# Patient Record
Sex: MALE | Race: WHITE
[De-identification: names, ages, dates, MRNs, and addresses within clinical notes are randomized per-mention and may not be internally consistent; named-entity substitution may affect disease eponyms.]

---

## 2024-01-23 PROBLEM — Z00.00 ENCOUNTER FOR PREVENTIVE HEALTH EXAMINATION: Status: ACTIVE | Noted: 2024-01-23

## 2024-01-26 ENCOUNTER — APPOINTMENT (OUTPATIENT)
Dept: ORTHOPEDIC SURGERY | Facility: CLINIC | Age: 64
End: 2024-01-26
Payer: COMMERCIAL

## 2024-01-26 VITALS — WEIGHT: 235 LBS | HEIGHT: 72 IN | BODY MASS INDEX: 31.83 KG/M2

## 2024-01-26 DIAGNOSIS — Z78.9 OTHER SPECIFIED HEALTH STATUS: ICD-10-CM

## 2024-01-26 DIAGNOSIS — F17.200 NICOTINE DEPENDENCE, UNSPECIFIED, UNCOMPLICATED: ICD-10-CM

## 2024-01-26 DIAGNOSIS — M65.30 TRIGGER FINGER, UNSPECIFIED FINGER: ICD-10-CM

## 2024-01-26 PROCEDURE — 73130 X-RAY EXAM OF HAND: CPT | Mod: 50

## 2024-01-26 PROCEDURE — 20550 NJX 1 TENDON SHEATH/LIGAMENT: CPT | Mod: 50

## 2024-01-26 PROCEDURE — 99204 OFFICE O/P NEW MOD 45 MIN: CPT | Mod: 25

## 2024-01-26 RX ORDER — SIMVASTATIN 80 MG/1
80 TABLET, FILM COATED ORAL
Refills: 0 | Status: ACTIVE | COMMUNITY

## 2024-01-26 NOTE — ASSESSMENT
[FreeTextEntry1] : EXAM Right hand with no swelling or erythema. Able to make fist, oppose thumb to small finger with good thenar bulk. No intrinsic atrophy. Tender along distal palmar crease, most notable at thumb. No overt locking with catching palpable. Sensation intact throughout with <2sec cap refill.  Right hand radiographs with no fracture nor dislocation. Carpus aligned. (3-view)    Left hand with no swelling or erythema. Able to make fist, oppose thumb to small finger with good thenar bulk. No intrinsic atrophy. Tender along distal palmar crease, most notable at thumb. No overt locking with catching palpable. Sensation intact throughout with <2sec cap refill.  Left hand radiographs with no fracture nor dislocation. Carpus aligned. (3-view)     ASSESSMENT & PLAN Bilateral thumb trigger - Discussed with patient the pathoanatomy of trigger and options going forward. Risks/benefits discussed as well as natural progression that injection will provide some relief but symptoms may recur. Discussed that pain may worsen in coming days but will subside. Discussed that we can repeat an injection or that operative intervention may be indicated down the line. After discussion of risks/benefits, including glucose intolerance in the diabetic population, patient elected to proceed with CSI to the A1 pulley.  Procedure 1 - 0.5cc 1% lidocaine + 0.5cc 40mg/cc Kenalog administered to the right thumb A1 pulley following sterilization with Betadine. Patient tolerated this well. Procedure 2 - 0.5cc 1% lidocaine + 0.5cc 40mg/cc Kenalog administered to the left thumb A1 pulley following sterilization with Betadine. Patient tolerated this well.   F/u 3months

## 2024-01-26 NOTE — HISTORY OF PRESENT ILLNESS
[de-identified] : Age: 63M PMHx: HLD Hand Dominance: RHD Chief Complaint: bilateral hand pain (Right hand onset approx 6-8 months ago, Left hand onset early January 2024) with NKI. Patient reports his symptoms onset with no precipitating factors. Patient reports that he has constant soreness in the base of the bilateral thumbs that radiates into the hands. Patient reports that his left thumb is also triggering, stating that it clicking and locking. Denies numbness/tingling.  Trauma: NKI Outside Imaging/Treatment: none OTC Medications:  OT/PT: none  Bracing: none  Pain worse with: exertion Pain better with: rest Numbness/tingling: none

## 2024-02-15 ENCOUNTER — APPOINTMENT (OUTPATIENT)
Dept: CARDIOLOGY | Facility: CLINIC | Age: 64
End: 2024-02-15
Payer: COMMERCIAL

## 2024-02-15 VITALS
SYSTOLIC BLOOD PRESSURE: 112 MMHG | BODY MASS INDEX: 31.97 KG/M2 | DIASTOLIC BLOOD PRESSURE: 74 MMHG | WEIGHT: 236 LBS | HEIGHT: 72 IN | OXYGEN SATURATION: 100 % | HEART RATE: 90 BPM

## 2024-02-15 VITALS — DIASTOLIC BLOOD PRESSURE: 74 MMHG | SYSTOLIC BLOOD PRESSURE: 118 MMHG

## 2024-02-15 DIAGNOSIS — R06.00 DYSPNEA, UNSPECIFIED: ICD-10-CM

## 2024-02-15 DIAGNOSIS — Z87.09 PERSONAL HISTORY OF OTHER DISEASES OF THE RESPIRATORY SYSTEM: ICD-10-CM

## 2024-02-15 DIAGNOSIS — R06.02 SHORTNESS OF BREATH: ICD-10-CM

## 2024-02-15 DIAGNOSIS — Z86.39 PERSONAL HISTORY OF OTHER ENDOCRINE, NUTRITIONAL AND METABOLIC DISEASE: ICD-10-CM

## 2024-02-15 DIAGNOSIS — E78.5 HYPERLIPIDEMIA, UNSPECIFIED: ICD-10-CM

## 2024-02-15 DIAGNOSIS — F10.21 ALCOHOL DEPENDENCE, IN REMISSION: ICD-10-CM

## 2024-02-15 DIAGNOSIS — Z87.898 PERSONAL HISTORY OF OTHER SPECIFIED CONDITIONS: ICD-10-CM

## 2024-02-15 PROCEDURE — 99244 OFF/OP CNSLTJ NEW/EST MOD 40: CPT

## 2024-02-15 RX ORDER — BUDESONIDE AND FORMOTEROL FUMARATE DIHYDRATE 160; 4.5 UG/1; UG/1
160-4.5 AEROSOL RESPIRATORY (INHALATION)
Refills: 0 | Status: ACTIVE | COMMUNITY

## 2024-02-15 RX ORDER — ALBUTEROL 90 MCG
90 AEROSOL (GRAM) INHALATION
Refills: 0 | Status: ACTIVE | COMMUNITY

## 2024-02-15 NOTE — REASON FOR VISIT
[FreeTextEntry1] : The patient is referred for cardiology evaluation because of a long history of cigarette smoking.  The patient has some shortness of breath.  The patient has been smoking cigarettes since his teenage years.  He quit briefly but has relapsed.  In addition the patient has history of crack cocaine use.  In addition the patient is a recovering alcoholic.  The patient is able to exercise 2030 minutes.  There is no exertional chest discomfort or shortness of breath.  Sometimes the patient coughs and during coughing he feels short of breath.  There has been no chest discomfort.  The patient's father coronary disease in his 50s.  The patient's father also was a cigarette smoker.

## 2024-02-15 NOTE — ASSESSMENT
[FreeTextEntry1] : Shortness of breath: Given the extensive cigarette smoking history have recommended pulmonary evaluation to consider low-dose CT of the chest for screening for cancer.  Exercise stress testing has been arranged to rule out ischemia.  Transthoracic echocardiography has been arranged to rule out pericardial disease.  Hyperlipidemia: The patient recently ran out of simvastatin and he is attempting to get but he is having insurance issues.  Once he is on a stable dose of simvastatin for 6 weeks time I have advised him to get LFTs and cholesterol profile which I have ordered for him.

## 2024-03-19 ENCOUNTER — APPOINTMENT (OUTPATIENT)
Dept: CT IMAGING | Facility: CLINIC | Age: 64
End: 2024-03-19

## 2024-04-22 ENCOUNTER — APPOINTMENT (OUTPATIENT)
Dept: CARDIOLOGY | Facility: CLINIC | Age: 64
End: 2024-04-22

## 2024-06-20 ENCOUNTER — INPATIENT (INPATIENT)
Facility: HOSPITAL | Age: 64
LOS: 17 days | Discharge: HOME | DRG: 881 | End: 2024-07-08
Attending: PSYCHIATRY & NEUROLOGY | Admitting: PSYCHIATRY & NEUROLOGY
Payer: MEDICARE

## 2024-06-20 VITALS
HEART RATE: 78 BPM | TEMPERATURE: 97 F | RESPIRATION RATE: 18 BRPM | DIASTOLIC BLOOD PRESSURE: 83 MMHG | HEIGHT: 72 IN | WEIGHT: 204.59 LBS | SYSTOLIC BLOOD PRESSURE: 120 MMHG

## 2024-06-20 DIAGNOSIS — F32.A DEPRESSION, UNSPECIFIED: ICD-10-CM

## 2024-06-20 PROCEDURE — 36415 COLL VENOUS BLD VENIPUNCTURE: CPT

## 2024-06-20 PROCEDURE — 84443 ASSAY THYROID STIM HORMONE: CPT

## 2024-06-20 PROCEDURE — 83036 HEMOGLOBIN GLYCOSYLATED A1C: CPT

## 2024-06-20 PROCEDURE — 80053 COMPREHEN METABOLIC PANEL: CPT

## 2024-06-20 PROCEDURE — 85027 COMPLETE CBC AUTOMATED: CPT

## 2024-06-20 PROCEDURE — 80061 LIPID PANEL: CPT

## 2024-06-20 RX ORDER — SERTRALINE HYDROCHLORIDE 100 MG/1
50 TABLET, FILM COATED ORAL DAILY
Refills: 0 | Status: DISCONTINUED | OUTPATIENT
Start: 2024-06-20 | End: 2024-06-21

## 2024-06-20 RX ORDER — LORAZEPAM 0.5 MG
1 TABLET ORAL EVERY 4 HOURS
Refills: 0 | Status: DISCONTINUED | OUTPATIENT
Start: 2024-06-20 | End: 2024-06-21

## 2024-06-20 RX ORDER — ACETAMINOPHEN 325 MG
650 TABLET ORAL EVERY 6 HOURS
Refills: 0 | Status: DISCONTINUED | OUTPATIENT
Start: 2024-06-20 | End: 2024-07-08

## 2024-06-20 RX ORDER — TRAZODONE HYDROCHLORIDE 50 MG/1
50 TABLET, FILM COATED ORAL AT BEDTIME
Refills: 0 | Status: DISCONTINUED | OUTPATIENT
Start: 2024-06-20 | End: 2024-07-08

## 2024-06-20 RX ADMIN — TRAZODONE HYDROCHLORIDE 50 MILLIGRAM(S): 50 TABLET, FILM COATED ORAL at 20:39

## 2024-06-20 RX ADMIN — Medication 1 MILLIGRAM(S): at 20:39

## 2024-06-20 NOTE — ED BEHAVIORAL HEALTH NOTE - BEHAVIORAL HEALTH NOTE
I have reviewed the information from the ED Psychiatric assessment, communicated with the sending psychiatric team (directly or via handoff from previous shift attending), and interviewed the patient.  I have confirmed that the patient is in need of care and treatment in an inpatient psychiatric hospital since the patient poses a substantial threat of harm to self or others as a result of their mental illness. I have afforded the patient the opportunity to ask questions regarding their inpatient psychiatry status and rights.  I have written orders to address active medical, substance, and psychiatric conditions- and discussed these orders with the inpatient RN. There is NO clinical indication at this time for constant observation for suicidality, self-harm, hypersexuality, aggression, falls, or elopement.      Clinical summary, assessment, and plan in sending ED chart with the following modifications:   Ativan 1mg PRN for anxiety exacerbation

## 2024-06-20 NOTE — BH PATIENT PROFILE - NSSBIRTALCPOSREINDET_GEN_A_CORE
pt enc to continue his sobriety and discuss feelings/ needs to better build his [id number] and coping mechanisms.

## 2024-06-20 NOTE — BH PATIENT PROFILE - FALL HARM RISK - UNIVERSAL INTERVENTIONS
Bed in lowest position, wheels locked, appropriate side rails in place/Call bell, personal items and telephone in reach/Instruct patient to call for assistance before getting out of bed or chair/Non-slip footwear when patient is out of bed/[place] to call system/Physically safe environment - no spills, clutter or unnecessary equipment/Purposeful Proactive Rounding/Room/bathroom lighting operational, light cord in reach

## 2024-06-20 NOTE — BH PATIENT PROFILE - NSICDXPASTMEDICALHX_GEN_ALL_CORE_FT
PAST MEDICAL HISTORY:  H/O: depression     History of alcohol use     History of anxiety     Hyperlipidemia

## 2024-06-20 NOTE — BH PATIENT PROFILE - NSSBIRTDRGACTION/INTER_GEN_A_CORE
Positive reinforcement/Brief intervention Positive reinforcement/Brief intervention/Passive referral to treatment

## 2024-06-20 NOTE — BH PATIENT PROFILE - HOME MEDICATIONS
No Rx/Hx Recorded Zoloft 25 mg oral tablet , 1 tab(s) orally once a day  simvastatin 80 mg oral tablet , 1 tab(s) orally once a day  Symbicort 160 mcg-4.5 mcg/inh inhalation aerosol , 2 puff(s) inhaled 2 times a day

## 2024-06-21 DIAGNOSIS — F10.21 ALCOHOL DEPENDENCE, IN REMISSION: ICD-10-CM

## 2024-06-21 DIAGNOSIS — F14.11 COCAINE ABUSE, IN REMISSION: ICD-10-CM

## 2024-06-21 PROCEDURE — 99232 SBSQ HOSP IP/OBS MODERATE 35: CPT

## 2024-06-21 RX ORDER — BUDESONIDE/FORMOTEROL FUMARATE 160-4.5MCG
2 HFA AEROSOL WITH ADAPTER (GRAM) INHALATION
Refills: 0 | Status: DISCONTINUED | OUTPATIENT
Start: 2024-06-21 | End: 2024-07-08

## 2024-06-21 RX ORDER — NICOTINE POLACRILEX 2 MG/1
1 LOZENGE ORAL DAILY
Refills: 0 | Status: DISCONTINUED | OUTPATIENT
Start: 2024-06-21 | End: 2024-07-08

## 2024-06-21 RX ORDER — HALOPERIDOL DECANOATE 100 MG/ML
5 VIAL (ML) INTRAMUSCULAR EVERY 6 HOURS
Refills: 0 | Status: DISCONTINUED | OUTPATIENT
Start: 2024-06-21 | End: 2024-07-08

## 2024-06-21 RX ORDER — NICOTINE POLACRILEX 2 MG/1
2 LOZENGE ORAL
Refills: 0 | Status: DISCONTINUED | OUTPATIENT
Start: 2024-06-21 | End: 2024-07-08

## 2024-06-21 RX ORDER — LORAZEPAM 0.5 MG
2 TABLET ORAL EVERY 6 HOURS
Refills: 0 | Status: DISCONTINUED | OUTPATIENT
Start: 2024-06-21 | End: 2024-06-21

## 2024-06-21 RX ORDER — HYDROXYZINE PAMOATE 50 MG/1
50 CAPSULE ORAL EVERY 6 HOURS
Refills: 0 | Status: DISCONTINUED | OUTPATIENT
Start: 2024-06-21 | End: 2024-07-08

## 2024-06-21 RX ORDER — ATORVASTATIN CALCIUM 20 MG/1
40 TABLET, FILM COATED ORAL AT BEDTIME
Refills: 0 | Status: DISCONTINUED | OUTPATIENT
Start: 2024-06-21 | End: 2024-07-08

## 2024-06-21 RX ADMIN — Medication 50 MILLIGRAM(S): at 19:53

## 2024-06-21 RX ADMIN — Medication 2 PUFF(S): at 19:54

## 2024-06-21 RX ADMIN — SERTRALINE HYDROCHLORIDE 50 MILLIGRAM(S): 100 TABLET, FILM COATED ORAL at 08:45

## 2024-06-21 RX ADMIN — ATORVASTATIN CALCIUM 40 MILLIGRAM(S): 20 TABLET, FILM COATED ORAL at 19:54

## 2024-06-21 RX ADMIN — TRAZODONE HYDROCHLORIDE 50 MILLIGRAM(S): 50 TABLET, FILM COATED ORAL at 19:53

## 2024-06-21 NOTE — BH INPATIENT PSYCHIATRY ASSESSMENT NOTE - NSBHMETABOLIC_PSY_ALL_CORE_FT
BMI: BMI (kg/m2): 27.7 (06-20-24 @ 19:28)  HbA1c:   Glucose:   BP: 90/64 (06-21-24 @ 08:22) (90/64 - 120/83)Vital Signs Last 24 Hrs  T(C): 37.1 (06-21-24 @ 08:22), Max: 37.1 (06-21-24 @ 08:22)  T(F): 98.7 (06-21-24 @ 08:22), Max: 98.7 (06-21-24 @ 08:22)  HR: 85 (06-21-24 @ 08:22) (78 - 85)  BP: 90/64 (06-21-24 @ 08:22) (90/64 - 120/83)  BP(mean): --  RR: 18 (06-21-24 @ 08:22) (18 - 18)  SpO2: --      Lipid Panel:  BMI: BMI (kg/m2): 27.7 (06-20-24 @ 19:28)  HbA1c:   Glucose:   BP: 110/80 (06-21-24 @ 15:39) (90/64 - 120/83)Vital Signs Last 24 Hrs  T(C): 36.4 (06-21-24 @ 15:39), Max: 37.1 (06-21-24 @ 08:22)  T(F): 97.6 (06-21-24 @ 15:39), Max: 98.7 (06-21-24 @ 08:22)  HR: 106 (06-21-24 @ 15:39) (78 - 106)  BP: 110/80 (06-21-24 @ 15:39) (90/64 - 120/83)  BP(mean): --  RR: 18 (06-21-24 @ 15:39) (18 - 18)  SpO2: --      Lipid Panel:

## 2024-06-21 NOTE — BH SOCIAL WORK INITIAL PSYCHOSOCIAL EVALUATION - FAMILY HISTORY OF PSYCHIATRIC ILLNESS / SUICIDALITY
Partially impaired: cannot see medication labels or newsprint, but can see obstacles in path, and the surrounding layout; can count fingers at arm's length None known

## 2024-06-21 NOTE — BH INPATIENT PSYCHIATRY ASSESSMENT NOTE - RISK ASSESSMENT
risk factors: male, single, hx substance use, not engaged in tx, +SI, limited social support, sleep and appetite disturbances   protective factors: help-seeking bx, cooperative with treatment, sobriety, fair insight

## 2024-06-21 NOTE — BH INPATIENT PSYCHIATRY ASSESSMENT NOTE - NSBHADMITMEDEDUDETAILS_A_CORE FT
Medications management, encourage groups/DBT, suicide precautions, enhanced supervision Educated on indication, RABs and side effects profile of seroquel

## 2024-06-21 NOTE — BH INPATIENT PSYCHIATRY ASSESSMENT NOTE - NSBHCHARTREVIEWVS_PSY_A_CORE FT
Vital Signs Last 24 Hrs  T(C): 37.1 (06-21-24 @ 08:22), Max: 37.1 (06-21-24 @ 08:22)  T(F): 98.7 (06-21-24 @ 08:22), Max: 98.7 (06-21-24 @ 08:22)  HR: 85 (06-21-24 @ 08:22) (78 - 85)  BP: 90/64 (06-21-24 @ 08:22) (90/64 - 120/83)  BP(mean): --  RR: 18 (06-21-24 @ 08:22) (18 - 18)  SpO2: --     Vital Signs Last 24 Hrs  T(C): 36.4 (06-21-24 @ 15:39), Max: 37.1 (06-21-24 @ 08:22)  T(F): 97.6 (06-21-24 @ 15:39), Max: 98.7 (06-21-24 @ 08:22)  HR: 106 (06-21-24 @ 15:39) (78 - 106)  BP: 110/80 (06-21-24 @ 15:39) (90/64 - 120/83)  BP(mean): --  RR: 18 (06-21-24 @ 15:39) (18 - 18)  SpO2: --

## 2024-06-21 NOTE — BH INPATIENT PSYCHIATRY ASSESSMENT NOTE - DETAILS
mother and maternal aunts- bipolar disorder  son- substance use d/o As per HPI wellbutrin xl- worsened anxiety/hyper, zoloft- shakes mother- bipolar disorder, reports hx lithium and ECT treatment; maternal aunts- bipolar disorder; son- substance use d/o

## 2024-06-21 NOTE — BH INPATIENT PSYCHIATRY ASSESSMENT NOTE - NSTXSUICIDINTERMD_PSY_ALL_CORE
Medications management, encourage groups/DBT, suicide precautions, enhanced supervision, safety planning

## 2024-06-21 NOTE — CHART NOTE - NSCHARTNOTEFT_GEN_A_CORE
[facility] [phone]    Simbicort 160/4.5 2 puffs BID, 5/14  Simvastatin 80 mg once/day, 5/13  Zoloft 25 mg once/day, 5/8 Collateral obtained from [facility] [phone]-  Symbicort 160/4.5 2 puffs BID, 5/14  Simvastatin 80 mg once/day, 5/13  Zoloft 25 mg once/day, 5/8

## 2024-06-21 NOTE — BH INPATIENT PSYCHIATRY ASSESSMENT NOTE - NSBHCHARTREVIEWINVESTIGATE_PSY_A_CORE FT
EKG  EKG obtained on 6/19/24-  Ventricular rate 75  BPM  Final       Atrial rate 75  BPM  Final       P-R interval 188  ms  Final       QRS duration 84  ms  Final       Q-T interval 380  ms  Final       Q-T interval corrected 424  ms  Final       P wave axis 59  degrees  Final       R wave axis 59  degrees  Final       T wave axis 71  degrees  Final       WAVEFORM URL       Final       Diagnosis Line Normal sinus rhythm Normal ECG Confirmed by Valentino, Patrick ([id number]) on 6/20/2024 12:24:43 PM

## 2024-06-21 NOTE — BH INPATIENT PSYCHIATRY ASSESSMENT NOTE - NSBHASSESSSUMMFT_PSY_ALL_CORE
64-year-old male; domiciled at skilled nursing house; non-caregiver; self-reported PPHx of ?bipolar disorder, no prior admissions, denies hx of SA; PMHx of HLD; hx of polysubstance use (tobacco, alcohol, crack), reportedly sober x7 months, denies hx of withdrawal seizures/DTs  , +legal hx for DWIs; BIB EMS; psychiatry consulted for SI.  Pt with depressive symptoms, ongoing SI, unable to fully safety plan.  He would benefit from admission for safety and stabilization at this time.  63 y/o male, domiciled at [facility] house, non-caregiver, self-reported PPHx of bipolar disorder, no prior admissions, denies hx of SA/SIB, PMHx of HLD, hx of polysubstance use (tobacco, alcohol, crack; reportedly sober x7 months), denies hx of withdrawal seizures/DTs , +legal hx for DWIs, BIB EMS activated by self for SI. Pt endorsed worsening depressive sx in last month after quitting stressful job, and worry of being kicked out of his [facility] house d/t lack of participation. He endorsed poor sleep, anhedonia, does not want to get out of bed, low energy, decreased appetite. He also reports passive SI with thoughts of using a knife to his neck or a gun if he had one. Of note, pt with significant family hx of bipolar d/o and endorses episodes of likely hypomania, although difficult to ascertain during periods of sobriety. Pt will likely benefit from IPP at this time.     #Bipolar depression  -start seroquel 50 mg qHS, titrate seroquel 100 mg qHS (6/22) **continue to titrate as indicated    #AUD in early remission  #Hx cocaine abuse  -CATCH consult    #Agitation  -for agitation not amenable to verbal redirection, may give haldol 5 mg q6h prn and ativan 2 mg q6h prn with escalation to IM if pt is refusing PO and is an acute danger to self or/and others with repeat EKG to ensure QTc <500 ms 65 y/o male, domiciled at detention house, non-caregiver, self-reported PPHx of bipolar disorder, no prior admissions, denies hx of SA/SIB, PMHx of HLD, hx of polysubstance use (tobacco, alcohol, crack; reportedly sober x7 months), denies hx of withdrawal seizures/DTs , +legal hx for DWIs, BIB EMS activated by self for SI. Pt endorsed worsening depressive sx in last month after quitting stressful job, and worry of being kicked out of his detention house d/t lack of participation. He endorsed poor sleep, anhedonia, does not want to get out of bed, low energy, decreased appetite. He also reports passive SI with thoughts of using a knife to his neck or a gun if he had one. Of note, pt with significant family hx of bipolar d/o and endorses episodes of likely hypomania, although difficult to ascertain whether episodes occurred during periods of sobriety. Pt will likely benefit from IPP at this time.     #Bipolar depression  -start seroquel 50 mg qHS, titrate seroquel 100 mg qHS (6/22) **continue to titrate as indicated    #AUD in early remission  #Hx cocaine abuse  -CATCH consult    #Agitation  -for agitation not amenable to verbal redirection, may give haldol 5 mg q6h prn and ativan 2 mg q6h prn with escalation to IM if pt is refusing PO and is an acute danger to self or/and others with repeat EKG to ensure QTc <500 ms

## 2024-06-21 NOTE — BH INPATIENT PSYCHIATRY ASSESSMENT NOTE - HPI (INCLUDE ILLNESS QUALITY, SEVERITY, DURATION, TIMING, CONTEXT, MODIFYING FACTORS, ASSOCIATED SIGNS AND SYMPTOMS)
65 y/o male, domiciled at [facility], non-caregiver, self-reported PPHx of bipolar disorder, no prior admissions, denies hx of SA/SIB, PMHx of HLD, hx of polysubstance use (tobacco, alcohol, crack; reportedly sober x7 months), denies hx of withdrawal seizures/DTs , +legal hx for DWIs, BIB EMS activated by self for SI.       Pt reported he has been shaking a lot, feels dehydrated, states he is hardly urinating, and hasn't been eating resulting in >10 lb weight loss in last month.  Pt reports depressed mood on and off his whole life, however significantly worsened in last month after quitting his job 1.5 months ago. He reports his job as a  was too stressful. He reports in the last month, he has been with poor sleep, anhedonia, does not want to get out of bed, low energy, decreased appetite. He also reports passive SI with thoughts of using a knife to his neck or a gun if he had one.  Pt unable to identify what stops him from acting on these thoughts. He admits to fear that he will be kicked out of the [facility] house though denies relapsing or doing anything specific to warrant that. 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 He reports h/o episodes lasting 2-3 days of decreased sleep, elevated energy and increased goal-directed activities; of note, pt also reports longest period of sobriety lasting 1 year since age 7. He denies AH or VH. He denies his life is in danger or others are trying to harm/follow him. No overt delusions elicited.    SAFE Act completed:   Submitted On: 6/19/2024 6:15:49 PM    Reference Number: [id number]  65 y/o male, domiciled at [place] house, non-caregiver, self-reported PPHx of bipolar disorder, no prior admissions, denies hx of SA/SIB, PMHx of HLD, hx of polysubstance use (tobacco, alcohol, crack; reportedly sober x7 months), denies hx of withdrawal seizures/DTs , +legal hx for DWIs, BIB EMS activated by self for SI to [facility], and admitted to [facility] IPP.    Pt reported he has been shaking a lot, feels dehydrated, states he is hardly urinating, and hasn't been eating resulting in >10 lb weight loss in last month.  Pt reports depressed mood on and off his whole life, however significantly worsened in last month after quitting his job 1.5 months ago. He reports his job as a  was too stressful. He reports in the last month, he has been with poor sleep, anhedonia, does not want to get out of bed, low energy, decreased appetite. He also reports passive SI with thoughts of using a knife to his neck or a gun if he had one.  Pt unable to identify what stops him from acting on these thoughts. He admits to fear that he will be kicked out of the [place] house though denies relapsing or doing anything specific to warrant that. 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 He reports h/o episodes lasting 2-3 days of decreased sleep, elevated energy and increased goal-directed activities; of note, pt also reports longest period of sobriety lasting 1 year since age 7. He denies AH or VH. He denies his life is in danger or others are trying to harm/follow him. No overt delusions elicited.    SAFE Act completed:   Submitted On: 6/19/2024 6:15:49 PM    Reference Number: [id number]

## 2024-06-21 NOTE — CHART NOTE - NSCHARTNOTEFT_GEN_A_CORE
Spoke to patient's friend, Xander [phone], to gather collateral. Friend states that at baseline, patient is a "happy-go-génesis hollis" who likes to cook for the other residents at the long term house and is typically very cheerful. 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 Reports that patient would rarely leave the house, watching TV all day. Also reports patient losing interest in his usual hobbies such as golf and barely eating. When writer asked about patient experiencing SI, friend states that patient has said things such as "I'd be better off not alive" and "I wish I had a gun" Spoke to patient's friend, Xander [phone], to gather collateral. Friend states that at baseline, patient is a "happy-go-génesis hollis" who is typically very cheerful. 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 Reports that patient rarely leaves the house, watching TV all day. Reports patient losing interest in his usual hobbies such as golf and barely eating. When writer asked about patient experiencing SI, friend states that patient has verbalized things such as "I'd be better off not alive" and "I wish I had a gun". Also provides context, stating patient lost his son to drug overdose 3-4 years ago and does not have a relationship with his daughter. Per friend, in December, patient experienced an episode where he became very talkative, had increased energy, and decreased sleep (cooking at 5am). Spoke to patient's friend, Xander [phone]- states that at baseline, patient is a "happy-go-génesis hollis" who is typically very cheerful. 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 Reports that patient rarely leaves the house, watching TV all day. Reports patient losing interest in his usual hobbies such as golf and barely eating. When writer asked about patient experiencing SI, friend states that patient has verbalized things such as "I'd be better off not alive" and "I wish I had a gun". Also provides context, stating patient lost his son to drug overdose 3-4 years ago and does not have a relationship with his daughter. Per friend, in December, patient experienced an episode where he became very talkative, had increased energy, and decreased sleep (cooking at 5am).

## 2024-06-21 NOTE — BH INPATIENT PSYCHIATRY ASSESSMENT NOTE - CURRENT MEDICATION
MEDICATIONS  (STANDING):  atorvastatin 40 milliGRAM(s) Oral at bedtime  budesonide 160 MICROgram(s)/formoterol 4.5 MICROgram(s) Inhaler 2 Puff(s) Inhalation two times a day  QUEtiapine 50 milliGRAM(s) Oral at bedtime    MEDICATIONS  (PRN):  acetaminophen     Tablet .. 650 milliGRAM(s) Oral every 6 hours PRN Temp greater or equal to 38C (100.4F), Moderate Pain (4 - 6)  LORazepam     Tablet 1 milliGRAM(s) Oral every 4 hours PRN Anxiety  traZODone 50 milliGRAM(s) Oral at bedtime PRN insomnia   MEDICATIONS  (STANDING):  atorvastatin 40 milliGRAM(s) Oral at bedtime  budesonide 160 MICROgram(s)/formoterol 4.5 MICROgram(s) Inhaler 2 Puff(s) Inhalation two times a day  QUEtiapine 50 milliGRAM(s) Oral at bedtime    MEDICATIONS  (PRN):  acetaminophen     Tablet .. 650 milliGRAM(s) Oral every 6 hours PRN Temp greater or equal to 38C (100.4F), Moderate Pain (4 - 6)  haloperidol     Tablet 5 milliGRAM(s) Oral every 6 hours PRN agitation  hydrOXYzine hydrochloride 50 milliGRAM(s) Oral every 6 hours PRN anxiety/insomnia  LORazepam     Tablet 2 milliGRAM(s) Oral every 6 hours PRN combative bx  traZODone 50 milliGRAM(s) Oral at bedtime PRN insomnia   MEDICATIONS  (STANDING):  atorvastatin 40 milliGRAM(s) Oral at bedtime  budesonide 160 MICROgram(s)/formoterol 4.5 MICROgram(s) Inhaler 2 Puff(s) Inhalation two times a day  nicotine -  14 mG/24Hr(s) Patch 1 Patch Transdermal daily  QUEtiapine 50 milliGRAM(s) Oral at bedtime    MEDICATIONS  (PRN):  acetaminophen     Tablet .. 650 milliGRAM(s) Oral every 6 hours PRN Temp greater or equal to 38C (100.4F), Moderate Pain (4 - 6)  haloperidol     Tablet 5 milliGRAM(s) Oral every 6 hours PRN agitation  hydrOXYzine hydrochloride 50 milliGRAM(s) Oral every 6 hours PRN anxiety/insomnia  LORazepam     Tablet 2 milliGRAM(s) Oral every 6 hours PRN combative bx  nicotine  Polacrilex Gum 2 milliGRAM(s) Oral every 2 hours PRN Smoking Cessation  traZODone 50 milliGRAM(s) Oral at bedtime PRN insomnia

## 2024-06-21 NOTE — BH INPATIENT PSYCHIATRY ASSESSMENT NOTE - NSBHATTESTAPPBILLTIME_PSY_A_CORE
I attest my time as HORACIO is greater than 50% of the total combined time spent on qualifying patient care activities. I have reviewed and verified the documentation.

## 2024-06-21 NOTE — BH SOCIAL WORK INITIAL PSYCHOSOCIAL EVALUATION - OTHER PAST PSYCHIATRIC HISTORY (INCLUDE DETAILS REGARDING ONSET, COURSE OF ILLNESS, INPATIENT/OUTPATIENT TREATMENT)
PPHx of bipolar disorder, no prior admissions, denies hx of SA/SIB, PMHx of HLD, hx of polysubstance use (tobacco, alcohol, crack; reportedly sober x7 months)

## 2024-06-22 LAB
A1C WITH ESTIMATED AVERAGE GLUCOSE RESULT: 6.3 % — HIGH (ref 4–5.6)
ALBUMIN SERPL ELPH-MCNC: 4.1 G/DL — SIGNIFICANT CHANGE UP (ref 3.5–5.2)
ALP SERPL-CCNC: 128 U/L — HIGH (ref 30–115)
ALT FLD-CCNC: 19 U/L — SIGNIFICANT CHANGE UP (ref 0–41)
ANION GAP SERPL CALC-SCNC: 10 MMOL/L — SIGNIFICANT CHANGE UP (ref 7–14)
AST SERPL-CCNC: 12 U/L — SIGNIFICANT CHANGE UP (ref 0–41)
BILIRUB SERPL-MCNC: 0.5 MG/DL — SIGNIFICANT CHANGE UP (ref 0.2–1.2)
BUN SERPL-MCNC: 12 MG/DL — SIGNIFICANT CHANGE UP (ref 10–20)
CALCIUM SERPL-MCNC: 9 MG/DL — SIGNIFICANT CHANGE UP (ref 8.4–10.5)
CHLORIDE SERPL-SCNC: 103 MMOL/L — SIGNIFICANT CHANGE UP (ref 98–110)
CHOLEST SERPL-MCNC: 274 MG/DL — HIGH
CO2 SERPL-SCNC: 27 MMOL/L — SIGNIFICANT CHANGE UP (ref 17–32)
CREAT SERPL-MCNC: 0.8 MG/DL — SIGNIFICANT CHANGE UP (ref 0.7–1.5)
EGFR: 100 ML/MIN/1.73M2 — SIGNIFICANT CHANGE UP
ESTIMATED AVERAGE GLUCOSE: 134 MG/DL — HIGH (ref 68–114)
GLUCOSE SERPL-MCNC: 166 MG/DL — HIGH (ref 70–99)
HCT VFR BLD CALC: 43 % — SIGNIFICANT CHANGE UP (ref 42–52)
HDLC SERPL-MCNC: 36 MG/DL — LOW
HGB BLD-MCNC: 14.9 G/DL — SIGNIFICANT CHANGE UP (ref 14–18)
LIPID PNL WITH DIRECT LDL SERPL: 213 MG/DL — HIGH
MCHC RBC-ENTMCNC: 29.7 PG — SIGNIFICANT CHANGE UP (ref 27–31)
MCHC RBC-ENTMCNC: 34.7 G/DL — SIGNIFICANT CHANGE UP (ref 32–37)
MCV RBC AUTO: 85.8 FL — SIGNIFICANT CHANGE UP (ref 80–94)
NON HDL CHOLESTEROL: 238 MG/DL — HIGH
NRBC # BLD: 0 /100 WBCS — SIGNIFICANT CHANGE UP (ref 0–0)
PLATELET # BLD AUTO: 248 K/UL — SIGNIFICANT CHANGE UP (ref 130–400)
PMV BLD: 10.4 FL — SIGNIFICANT CHANGE UP (ref 7.4–10.4)
POTASSIUM SERPL-MCNC: 4.5 MMOL/L — SIGNIFICANT CHANGE UP (ref 3.5–5)
POTASSIUM SERPL-SCNC: 4.5 MMOL/L — SIGNIFICANT CHANGE UP (ref 3.5–5)
PROT SERPL-MCNC: 6.2 G/DL — SIGNIFICANT CHANGE UP (ref 6–8)
RBC # BLD: 5.01 M/UL — SIGNIFICANT CHANGE UP (ref 4.7–6.1)
RBC # FLD: 11.5 % — SIGNIFICANT CHANGE UP (ref 11.5–14.5)
SODIUM SERPL-SCNC: 140 MMOL/L — SIGNIFICANT CHANGE UP (ref 135–146)
TRIGL SERPL-MCNC: 123 MG/DL — SIGNIFICANT CHANGE UP
TSH SERPL-MCNC: 1.26 UIU/ML — SIGNIFICANT CHANGE UP (ref 0.27–4.2)
WBC # BLD: 8.77 K/UL — SIGNIFICANT CHANGE UP (ref 4.8–10.8)
WBC # FLD AUTO: 8.77 K/UL — SIGNIFICANT CHANGE UP (ref 4.8–10.8)

## 2024-06-22 RX ADMIN — Medication 100 MILLIGRAM(S): at 20:56

## 2024-06-22 RX ADMIN — ATORVASTATIN CALCIUM 40 MILLIGRAM(S): 20 TABLET, FILM COATED ORAL at 20:56

## 2024-06-22 RX ADMIN — Medication 50 MILLIGRAM(S): at 11:58

## 2024-06-22 RX ADMIN — Medication 2 PUFF(S): at 20:57

## 2024-06-22 NOTE — BH INPATIENT PSYCHIATRY PROGRESS NOTE - NSBHMETABOLIC_PSY_ALL_CORE_FT
BMI: BMI (kg/m2): 27.7 (06-20-24 @ 19:28)  HbA1c:   Glucose:   BP: 129/77 (06-22-24 @ 08:00) (90/64 - 129/77)Vital Signs Last 24 Hrs  T(C): 36.5 (06-22-24 @ 08:00), Max: 36.5 (06-22-24 @ 08:00)  T(F): 97.7 (06-22-24 @ 08:00), Max: 97.7 (06-22-24 @ 08:00)  HR: 75 (06-22-24 @ 08:00) (75 - 106)  BP: 129/77 (06-22-24 @ 08:00) (110/80 - 129/77)  BP(mean): --  RR: 18 (06-22-24 @ 08:00) (18 - 18)  SpO2: --      Lipid Panel: Date/Time: 06-22-24 @ 08:25  Cholesterol, Serum: 274  LDL Cholesterol Calculated: 213  HDL Cholesterol, Serum: 36  Total Cholesterol/HDL Ration Measurement: --  Triglycerides, Serum: 123

## 2024-06-22 NOTE — BH INPATIENT PSYCHIATRY PROGRESS NOTE - NSBHCHARTREVIEWVS_PSY_A_CORE FT
Vital Signs Last 24 Hrs  T(C): 36.5 (06-22-24 @ 08:00), Max: 36.5 (06-22-24 @ 08:00)  T(F): 97.7 (06-22-24 @ 08:00), Max: 97.7 (06-22-24 @ 08:00)  HR: 75 (06-22-24 @ 08:00) (75 - 106)  BP: 129/77 (06-22-24 @ 08:00) (110/80 - 129/77)  BP(mean): --  RR: 18 (06-22-24 @ 08:00) (18 - 18)  SpO2: --

## 2024-06-22 NOTE — BH INPATIENT PSYCHIATRY PROGRESS NOTE - NSBHASSESSSUMMFT_PSY_ALL_CORE
63 y/o male, domiciled at long term house, non-caregiver, self-reported PPHx of bipolar disorder, no prior admissions, denies hx of SA/SIB, PMHx of HLD, hx of polysubstance use (tobacco, alcohol, crack; reportedly sober x7 months), denies hx of withdrawal seizures/DTs , +legal hx for DWIs, BIB EMS activated by self for SI. Pt endorsed worsening depressive sx in last month after quitting stressful job, and worry of being kicked out of his long term house d/t lack of participation. He endorsed poor sleep, anhedonia, does not want to get out of bed, low energy, decreased appetite. He also reports passive SI with thoughts of using a knife to his neck or a gun if he had one. Of note, pt with significant family hx of bipolar d/o and endorses episodes of likely hypomania, although difficult to ascertain whether episodes occurred during periods of sobriety. Pt will likely benefit from IPP at this time.     6/22: pt presents as agitated, anxious about having committed a crime, expressing guilt and trying to turn himself into the police    #Bipolar depression  -given one time dose of seroquel 50 mg on 6/22 AM, start seroquel 100 mg qHS (6/22) **continue to titrate as indicated    #AUD in early remission  #Hx cocaine abuse  -CATCH consult    #Agitation  -for agitation not amenable to verbal redirection, may give haldol 5 mg q6h prn and ativan 2 mg q6h prn with escalation to IM if pt is refusing PO and is an acute danger to self or/and others with repeat EKG to ensure QTc <500 ms

## 2024-06-22 NOTE — BH INPATIENT PSYCHIATRY PROGRESS NOTE - OTHER
patient demonstrates bilateral large amplitude UE/LE shaking, able to control voluntarily, not tremulous; vigorously scratching face and head

## 2024-06-22 NOTE — BH INPATIENT PSYCHIATRY PROGRESS NOTE - NSBHFUPINTERVALHXFT_PSY_A_CORE
Patient reports that he's supposed to be with the police as he registered his car under an old client's name due to having issues registering it under his own. Patient reports feeling very guilty about this. He reports he's been sleeping on and off, feeling very anxious, his arms have been shaking since Father's day. Patient is noted to be scratching his face and head vigorously, appears upset and agitated. He is agreeable to one time dose of seroquel for anxiety. Per nursing, patient has been trying to call the police to report himself.

## 2024-06-22 NOTE — BH INPATIENT PSYCHIATRY PROGRESS NOTE - CURRENT MEDICATION
MEDICATIONS  (STANDING):  atorvastatin 40 milliGRAM(s) Oral at bedtime  budesonide 160 MICROgram(s)/formoterol 4.5 MICROgram(s) Inhaler 2 Puff(s) Inhalation two times a day  nicotine -  14 mG/24Hr(s) Patch 1 Patch Transdermal daily  QUEtiapine 100 milliGRAM(s) Oral at bedtime    MEDICATIONS  (PRN):  acetaminophen     Tablet .. 650 milliGRAM(s) Oral every 6 hours PRN Temp greater or equal to 38C (100.4F), Moderate Pain (4 - 6)  haloperidol     Tablet 5 milliGRAM(s) Oral every 6 hours PRN agitation  hydrOXYzine hydrochloride 50 milliGRAM(s) Oral every 6 hours PRN anxiety/insomnia  LORazepam     Tablet 2 milliGRAM(s) Oral every 6 hours PRN combative bx  nicotine  Polacrilex Gum 2 milliGRAM(s) Oral every 2 hours PRN Smoking Cessation  traZODone 50 milliGRAM(s) Oral at bedtime PRN insomnia

## 2024-06-23 RX ADMIN — Medication 2 PUFF(S): at 12:15

## 2024-06-23 RX ADMIN — TRAZODONE HYDROCHLORIDE 50 MILLIGRAM(S): 50 TABLET, FILM COATED ORAL at 20:23

## 2024-06-23 RX ADMIN — Medication 100 MILLIGRAM(S): at 20:23

## 2024-06-23 RX ADMIN — ATORVASTATIN CALCIUM 40 MILLIGRAM(S): 20 TABLET, FILM COATED ORAL at 20:23

## 2024-06-23 NOTE — BH INPATIENT PSYCHIATRY PROGRESS NOTE - NSBHFUPINTERVALHXFT_PSY_A_CORE
Nursing reports no acute events overnight. Per chart review the patient has not required any behavioral PRNS since the last assessment.    Chart reviewed, patient seen and evaluated in AM. On approach patient is lying in his bed. He is calm and cooperative, well groomed but appears dysthymic. At times his right arms shaking. He states that he feels "horrible" and notes that he is very "anxious" because of his legal issues allegedly involving registering a car under another person's address. Patient reports that because of this he has been having some passive suicidal ideation.        No HI/AVH elicited.

## 2024-06-23 NOTE — BH INPATIENT PSYCHIATRY PROGRESS NOTE - NSBHMETABOLIC_PSY_ALL_CORE_FT
BMI: BMI (kg/m2): 27.7 (06-20-24 @ 19:28)  HbA1c: A1C with Estimated Average Glucose Result: 6.3 % (06-22-24 @ 08:25)    Glucose:   BP: 112/73 (06-23-24 @ 15:59) (90/64 - 129/77)Vital Signs Last 24 Hrs  T(C): 36.9 (06-23-24 @ 15:59), Max: 36.9 (06-23-24 @ 15:59)  T(F): 98.4 (06-23-24 @ 15:59), Max: 98.4 (06-23-24 @ 15:59)  HR: 65 (06-23-24 @ 15:59) (65 - 66)  BP: 112/73 (06-23-24 @ 15:59) (112/73 - 112/75)  BP(mean): --  RR: 16 (06-23-24 @ 15:59) (16 - 18)  SpO2: --      Lipid Panel: Date/Time: 06-22-24 @ 08:25  Cholesterol, Serum: 274  LDL Cholesterol Calculated: 213  HDL Cholesterol, Serum: 36  Total Cholesterol/HDL Ration Measurement: --  Triglycerides, Serum: 123

## 2024-06-23 NOTE — BH INPATIENT PSYCHIATRY PROGRESS NOTE - NSBHCHARTREVIEWVS_PSY_A_CORE FT
Vital Signs Last 24 Hrs  T(C): 36.9 (06-23-24 @ 15:59), Max: 36.9 (06-23-24 @ 15:59)  T(F): 98.4 (06-23-24 @ 15:59), Max: 98.4 (06-23-24 @ 15:59)  HR: 65 (06-23-24 @ 15:59) (65 - 66)  BP: 112/73 (06-23-24 @ 15:59) (112/73 - 112/75)  BP(mean): --  RR: 16 (06-23-24 @ 15:59) (16 - 18)  SpO2: --

## 2024-06-23 NOTE — BH INPATIENT PSYCHIATRY PROGRESS NOTE - NSBHASSESSSUMMFT_PSY_ALL_CORE
65 y/o male, domiciled at jail house, non-caregiver, self-reported PPHx of bipolar disorder, no prior admissions, denies hx of SA/SIB, PMHx of HLD, hx of polysubstance use (tobacco, alcohol, crack; reportedly sober x7 months), denies hx of withdrawal seizures/DTs , +legal hx for DWIs, BIB EMS activated by self for SI. Pt endorsed worsening depressive sx in last month after quitting stressful job, and worry of being kicked out of his jail house d/t lack of participation. He endorsed poor sleep, anhedonia, does not want to get out of bed, low energy, decreased appetite. He also reports passive SI with thoughts of using a knife to his neck or a gun if he had one. Of note, pt with significant family hx of bipolar d/o and endorses episodes of likely hypomania, although difficult to ascertain whether episodes occurred during periods of sobriety. Pt will likely benefit from IPP at this time.     6/23: Pt presents as dysthymic and anxious about legal worries, feels "horrible", endorses passive SI    Impression: Substance induced mood disorder vs MDD vs Bipolar disorder     #Bipolar depression  -Given one time dose of seroquel 50 mg on 6/22 AM, continue seroquel 100 mg qHS (6/22) **continue to titrate as indicated    #AUD in early remission  #Hx cocaine abuse  -CATCH consult    #Agitation  -for agitation not amenable to verbal redirection, may give haldol 5 mg q6h prn and ativan 2 mg q6h prn with escalation to IM if pt is refusing PO and is an acute danger to self or/and others with repeat EKG to ensure QTc <500 ms

## 2024-06-23 NOTE — BH INPATIENT PSYCHIATRY PROGRESS NOTE - NSBHATTESTCOMMENTATTENDFT_PSY_A_CORE
I have reviewed case with resident and made any necessary changes or additions.  I concur with findings and plan. 
63 y/o male, domiciled at snf house, non-caregiver, self-reported PPHx of bipolar disorder, no prior admissions, denies hx of SA/SIB, PMHx of HLD, hx of polysubstance use (tobacco, alcohol, crack; reportedly sober x7 months), denies hx of withdrawal seizures/DTs , +legal hx for DWIs, BIB EMS activated by self for SI. Pt endorsed worsening depressive sx in last month after quitting stressful job, and worry of being kicked out of his snf house d/t lack of participation. He endorsed poor sleep, anhedonia, does not want to get out of bed, low energy, decreased appetite. He also reports passive SI with thoughts of using a knife to his neck or a gun if he had one. Of note, pt with significant family hx of bipolar d/o and endorses episodes of likely hypomania, although difficult to ascertain whether episodes occurred during periods of sobriety. Pt will likely benefit from IPP at this time.

## 2024-06-24 PROCEDURE — 99232 SBSQ HOSP IP/OBS MODERATE 35: CPT

## 2024-06-24 RX ADMIN — Medication 2 PUFF(S): at 08:32

## 2024-06-24 RX ADMIN — Medication 2 PUFF(S): at 20:05

## 2024-06-24 RX ADMIN — TRAZODONE HYDROCHLORIDE 50 MILLIGRAM(S): 50 TABLET, FILM COATED ORAL at 20:05

## 2024-06-24 RX ADMIN — ATORVASTATIN CALCIUM 40 MILLIGRAM(S): 20 TABLET, FILM COATED ORAL at 20:05

## 2024-06-24 RX ADMIN — Medication 200 MILLIGRAM(S): at 20:05

## 2024-06-24 RX ADMIN — NICOTINE POLACRILEX 1 PATCH: 2 LOZENGE ORAL at 08:30

## 2024-06-24 NOTE — BH INPATIENT PSYCHIATRY PROGRESS NOTE - NSBHASSESSSUMMFT_PSY_ALL_CORE
65 y/o male, domiciled at longterm house, non-caregiver, self-reported PPHx of bipolar disorder, no prior admissions, denies hx of SA/SIB, PMHx of HLD, hx of polysubstance use (tobacco, alcohol, crack; reportedly sober x7 months), denies hx of withdrawal seizures/DTs , +legal hx for DWIs, BIB EMS activated by self for SI. Pt endorsed worsening depressive sx in last month after quitting stressful job, and worry of being kicked out of his longterm house d/t lack of participation. He endorsed poor sleep, anhedonia, does not want to get out of bed, low energy, decreased appetite. He also reports passive SI with thoughts of using a knife to his neck or a gun if he had one. UDS negative. Of note, pt with significant family hx of bipolar d/o and endorses episodes of likely hypomania, although difficult to ascertain whether episodes occurred during periods of sobriety. Pt will likely benefit from IPP at this time.     #Bipolar depression  -titrate seroquel 200 mg qhs  -encourage groups/DBT    #AUD in early remission  #Hx cocaine abuse  -CATCH consult    #Agitation  -for agitation not amenable to verbal redirection, may give haldol 5 mg q6h prn and ativan 2 mg q6h prn with escalation to IM if pt is refusing PO and is an acute danger to self or/and others with repeat EKG to ensure QTc <500 ms

## 2024-06-24 NOTE — BH INPATIENT PSYCHIATRY PROGRESS NOTE - NSBHCHARTREVIEWVS_PSY_A_CORE FT
Vital Signs Last 24 Hrs  T(C): 36.7 (06-24-24 @ 08:14), Max: 36.9 (06-23-24 @ 15:59)  T(F): 98.1 (06-24-24 @ 08:14), Max: 98.4 (06-23-24 @ 15:59)  HR: 71 (06-24-24 @ 08:14) (65 - 71)  BP: 114/73 (06-24-24 @ 08:14) (112/73 - 114/73)  BP(mean): --  RR: 16 (06-24-24 @ 08:14) (16 - 16)  SpO2: --

## 2024-06-24 NOTE — BH INPATIENT PSYCHIATRY PROGRESS NOTE - NSBHFUPINTERVALHXFT_PSY_A_CORE
Pt seen and evaluated, chart reviewed. As per nursing report, pt c/o insomnia overnight, PRN trazodone given with fair results. On evaluation, pt presents dysphoric in bed, states he feels "terrible". He reports continued low mood and anxiety with poor sleep. He admits to anxious ruminations on legal issues, states he should be arrested for registering his car under someone else's name. He reports he can't stop shaking (observed with no abnormal movements when resting in bed), is with difficulty going to the bathroom and with low energy. He denies AVH. He denies active SI/HI, intent and plan. Pt isolative to room, encouraged groups/DBT.

## 2024-06-24 NOTE — BH INPATIENT PSYCHIATRY PROGRESS NOTE - NSBHMETABOLIC_PSY_ALL_CORE_FT
BMI: BMI (kg/m2): 27.7 (06-20-24 @ 19:28)  HbA1c: A1C with Estimated Average Glucose Result: 6.3 % (06-22-24 @ 08:25)    Glucose:   BP: 114/73 (06-24-24 @ 08:14) (110/80 - 129/77)Vital Signs Last 24 Hrs  T(C): 36.7 (06-24-24 @ 08:14), Max: 36.9 (06-23-24 @ 15:59)  T(F): 98.1 (06-24-24 @ 08:14), Max: 98.4 (06-23-24 @ 15:59)  HR: 71 (06-24-24 @ 08:14) (65 - 71)  BP: 114/73 (06-24-24 @ 08:14) (112/73 - 114/73)  BP(mean): --  RR: 16 (06-24-24 @ 08:14) (16 - 16)  SpO2: --      Lipid Panel: Date/Time: 06-22-24 @ 08:25  Cholesterol, Serum: 274  LDL Cholesterol Calculated: 213  HDL Cholesterol, Serum: 36  Total Cholesterol/HDL Ration Measurement: --  Triglycerides, Serum: 123

## 2024-06-24 NOTE — BH INPATIENT PSYCHIATRY PROGRESS NOTE - CURRENT MEDICATION
MEDICATIONS  (STANDING):  atorvastatin 40 milliGRAM(s) Oral at bedtime  budesonide 160 MICROgram(s)/formoterol 4.5 MICROgram(s) Inhaler 2 Puff(s) Inhalation two times a day  nicotine -  14 mG/24Hr(s) Patch 1 Patch Transdermal daily  QUEtiapine 200 milliGRAM(s) Oral at bedtime    MEDICATIONS  (PRN):  acetaminophen     Tablet .. 650 milliGRAM(s) Oral every 6 hours PRN Temp greater or equal to 38C (100.4F), Moderate Pain (4 - 6)  haloperidol     Tablet 5 milliGRAM(s) Oral every 6 hours PRN agitation  hydrOXYzine hydrochloride 50 milliGRAM(s) Oral every 6 hours PRN anxiety/insomnia  LORazepam     Tablet 2 milliGRAM(s) Oral every 6 hours PRN combative bx  nicotine  Polacrilex Gum 2 milliGRAM(s) Oral every 2 hours PRN Smoking Cessation  traZODone 50 milliGRAM(s) Oral at bedtime PRN insomnia

## 2024-06-25 PROCEDURE — 99232 SBSQ HOSP IP/OBS MODERATE 35: CPT

## 2024-06-25 RX ADMIN — NICOTINE POLACRILEX 1 PATCH: 2 LOZENGE ORAL at 09:26

## 2024-06-25 RX ADMIN — Medication 2 PUFF(S): at 20:03

## 2024-06-25 RX ADMIN — Medication 250 MILLIGRAM(S): at 20:02

## 2024-06-25 RX ADMIN — ATORVASTATIN CALCIUM 40 MILLIGRAM(S): 20 TABLET, FILM COATED ORAL at 20:02

## 2024-06-25 RX ADMIN — Medication 2 PUFF(S): at 08:38

## 2024-06-25 RX ADMIN — Medication 50 MILLIGRAM(S): at 09:28

## 2024-06-25 NOTE — BH INPATIENT PSYCHIATRY PROGRESS NOTE - NSBHCHARTREVIEWVS_PSY_A_CORE FT
Vital Signs Last 24 Hrs  T(C): 36.4 (06-25-24 @ 08:00), Max: 36.6 (06-24-24 @ 15:38)  T(F): 97.6 (06-25-24 @ 08:00), Max: 97.8 (06-24-24 @ 15:38)  HR: 77 (06-25-24 @ 08:00) (66 - 77)  BP: 113/82 (06-25-24 @ 08:00) (113/82 - 128/78)  BP(mean): --  RR: 16 (06-25-24 @ 08:00) (16 - 16)  SpO2: --

## 2024-06-25 NOTE — BH INPATIENT PSYCHIATRY PROGRESS NOTE - NSBHMETABOLIC_PSY_ALL_CORE_FT
BMI: BMI (kg/m2): 27.7 (06-20-24 @ 19:28)  HbA1c: A1C with Estimated Average Glucose Result: 6.3 % (06-22-24 @ 08:25)    Glucose:   BP: 113/82 (06-25-24 @ 08:00) (112/73 - 128/78)Vital Signs Last 24 Hrs  T(C): 36.4 (06-25-24 @ 08:00), Max: 36.6 (06-24-24 @ 15:38)  T(F): 97.6 (06-25-24 @ 08:00), Max: 97.8 (06-24-24 @ 15:38)  HR: 77 (06-25-24 @ 08:00) (66 - 77)  BP: 113/82 (06-25-24 @ 08:00) (113/82 - 128/78)  BP(mean): --  RR: 16 (06-25-24 @ 08:00) (16 - 16)  SpO2: --      Lipid Panel: Date/Time: 06-22-24 @ 08:25  Cholesterol, Serum: 274  LDL Cholesterol Calculated: 213  HDL Cholesterol, Serum: 36  Total Cholesterol/HDL Ration Measurement: --  Triglycerides, Serum: 123

## 2024-06-25 NOTE — BH INPATIENT PSYCHIATRY PROGRESS NOTE - CURRENT MEDICATION
MEDICATIONS  (STANDING):  atorvastatin 40 milliGRAM(s) Oral at bedtime  budesonide 160 MICROgram(s)/formoterol 4.5 MICROgram(s) Inhaler 2 Puff(s) Inhalation two times a day  nicotine -  14 mG/24Hr(s) Patch 1 Patch Transdermal daily  QUEtiapine 250 milliGRAM(s) Oral at bedtime  QUEtiapine 50 milliGRAM(s) Oral daily    MEDICATIONS  (PRN):  acetaminophen     Tablet .. 650 milliGRAM(s) Oral every 6 hours PRN Temp greater or equal to 38C (100.4F), Moderate Pain (4 - 6)  haloperidol     Tablet 5 milliGRAM(s) Oral every 6 hours PRN agitation  hydrOXYzine hydrochloride 50 milliGRAM(s) Oral every 6 hours PRN anxiety/insomnia  LORazepam     Tablet 2 milliGRAM(s) Oral every 6 hours PRN combative bx  nicotine  Polacrilex Gum 2 milliGRAM(s) Oral every 2 hours PRN Smoking Cessation  traZODone 50 milliGRAM(s) Oral at bedtime PRN insomnia

## 2024-06-25 NOTE — PROVIDER CONTACT NOTE (OTHER) - ACTION/TREATMENT ORDERED:
encouraged patient to verbalize any suicidal ideations and to tell staff if this thoughts continue to bother him.

## 2024-06-25 NOTE — BH INPATIENT PSYCHIATRY PROGRESS NOTE - NSBHFUPINTERVALHXFT_PSY_A_CORE
Pt seen and evaluated, chart reviewed. As per nursing report, pt c/o insomnia overnight, PRN trazodone given with fair results. On evaluation, pt continues to present dysphoric and in bed, states he feels "the same" "worse". He reports continued low mood and anxious ruminations on events leading to admission. He states he knows he is in trouble with the police for using another person's address for his car in [place] because he saw "the lady's address and my address" on the newspaper Friday night. He reports poor sleep 2/2 anxious ruminations. Pt noted to become more tremulous while discussing event. He reports limited appetite, eating half his plate, agrees to Ensure supplementation. He denies AH or VH. He denies safety concerns. He admits to feelings of hopelessness of getting better and passive SI, he denies intent and plan, agrees to safety plan. Pt has been isolative to room, encouraged groups/DBT. Denies negative side effects of medications.

## 2024-06-25 NOTE — PROVIDER CONTACT NOTE (OTHER) - SITUATION
Nelly DOWD made aware that patient verbalized feeling depressed and having passive suicidal ideations. When asked patient if he has a plan he "stated no, I feel safe here".

## 2024-06-25 NOTE — BH INPATIENT PSYCHIATRY PROGRESS NOTE - NSBHASSESSSUMMFT_PSY_ALL_CORE
65 y/o male, domiciled at prison house, non-caregiver, self-reported PPHx of bipolar disorder, no prior admissions, denies hx of SA/SIB, PMHx of HLD, hx of polysubstance use (tobacco, alcohol, crack; reportedly sober x7 months), denies hx of withdrawal seizures/DTs , +legal hx for DWIs, BIB EMS activated by self for SI. Pt endorsed worsening depressive sx in last month after quitting stressful job, and worry of being kicked out of his prison house d/t lack of participation. He endorsed poor sleep, anhedonia, does not want to get out of bed, low energy, decreased appetite. He also reports passive SI with thoughts of using a knife to his neck or a gun if he had one. UDS negative. Of note, pt with significant family hx of bipolar d/o and endorses episodes of likely hypomania, although difficult to ascertain whether episodes occurred during periods of sobriety. Pt will likely benefit from IPP at this time.     On evaluation, pt continues to presents dysphoric with anxious ruminations on events leading to admission. He reports limited appetite and disrupted sleep.     #Bipolar depression  -titrate seroquel 50 mg qAM/ 250 mg qhs  -encourage groups/DBT    #AUD in early remission  #Hx cocaine abuse  -CATCH consult    #Agitation  -for agitation not amenable to verbal redirection, may give haldol 5 mg q6h prn and ativan 2 mg q6h prn with escalation to IM if pt is refusing PO and is an acute danger to self or/and others with repeat EKG to ensure QTc <500 ms 63 y/o male, domiciled at detention house, non-caregiver, self-reported PPHx of bipolar disorder, no prior admissions, denies hx of SA/SIB, PMHx of HLD, hx of polysubstance use (tobacco, alcohol, crack; reportedly sober x7 months), denies hx of withdrawal seizures/DTs , +legal hx for DWIs, BIB EMS activated by self for SI. Pt endorsed worsening depressive sx in last month after quitting stressful job, and worry of being kicked out of his detention house d/t lack of participation. He endorsed poor sleep, anhedonia, does not want to get out of bed, low energy, decreased appetite. He also reports passive SI with thoughts of using a knife to his neck or a gun if he had one. UDS negative. Of note, pt with significant family hx of bipolar d/o and endorses episodes of likely hypomania, although difficult to ascertain whether episodes occurred during periods of sobriety. Pt will likely benefit from IPP at this time.     On evaluation, pt continues to presents dysphoric with anxious ruminations on events leading to admission. He also reports fear that he is in current legal problems, however fears appear irrational at this time. He endorses limited appetite and disrupted sleep. He admits to feelings of hopelessness and passive SI, denies intent and plan. is adherent to medications and denies negative side effects. Has not be a behavioral concern.     #Bipolar depression  -titrate seroquel 50 mg qAM/ 250 mg qhs  -encourage groups/DBT    #AUD in early remission  #Hx cocaine abuse  -CATCH consult    #Agitation  -for agitation not amenable to verbal redirection, may give haldol 5 mg q6h prn and ativan 2 mg q6h prn with escalation to IM if pt is refusing PO and is an acute danger to self or/and others with repeat EKG to ensure QTc <500 ms

## 2024-06-26 PROCEDURE — 99232 SBSQ HOSP IP/OBS MODERATE 35: CPT

## 2024-06-26 RX ORDER — SENNOSIDES 8.6 MG
1 TABLET ORAL ONCE
Refills: 0 | Status: COMPLETED | OUTPATIENT
Start: 2024-06-26 | End: 2024-06-26

## 2024-06-26 RX ADMIN — NICOTINE POLACRILEX 1 PATCH: 2 LOZENGE ORAL at 08:39

## 2024-06-26 RX ADMIN — Medication 300 MILLIGRAM(S): at 21:13

## 2024-06-26 RX ADMIN — NICOTINE POLACRILEX 1 PATCH: 2 LOZENGE ORAL at 21:07

## 2024-06-26 RX ADMIN — Medication 50 MILLIGRAM(S): at 08:39

## 2024-06-26 RX ADMIN — Medication 1 TABLET(S): at 21:24

## 2024-06-26 RX ADMIN — ATORVASTATIN CALCIUM 40 MILLIGRAM(S): 20 TABLET, FILM COATED ORAL at 21:13

## 2024-06-26 RX ADMIN — Medication 2 PUFF(S): at 21:13

## 2024-06-26 RX ADMIN — Medication 2 PUFF(S): at 08:40

## 2024-06-26 NOTE — BH DISCHARGE NOTE NURSING/SOCIAL WORK/PSYCH REHAB - NSCDUDCCRISIS_PSY_A_CORE
.National Suicide Prevention Lifeline 7 (772) 450-2897/.  [facility] Response Crisis Hotline  (552) 641-2203  24 hour telephone crisis intervention and suicide prevention hotline concerned with all mental health issues/988 Suicide and Crisis Lifeline [facility] Well  1 (806) [facility] (195-2099)  Text "WELL" to 27335  Website: www.nuevoStage/.National Suicide Prevention Lifeline 4 (774) 006-6381/.  [facility] Response Crisis Hotline  (721) 499-4744  24 hour telephone crisis intervention and suicide prevention hotline concerned with all mental health issues/988 Suicide and Crisis Lifeline

## 2024-06-26 NOTE — BH INPATIENT PSYCHIATRY PROGRESS NOTE - NSBHCHARTREVIEWVS_PSY_A_CORE FT
Vital Signs Last 24 Hrs  T(C): 36.9 (06-25-24 @ 15:27), Max: 36.9 (06-25-24 @ 15:27)  T(F): 98.5 (06-25-24 @ 15:27), Max: 98.5 (06-25-24 @ 15:27)  HR: 84 (06-25-24 @ 15:27) (84 - 84)  BP: 110/72 (06-25-24 @ 15:27) (110/72 - 110/72)  BP(mean): --  RR: 18 (06-25-24 @ 15:27) (18 - 18)  SpO2: --

## 2024-06-26 NOTE — BH DISCHARGE NOTE NURSING/SOCIAL WORK/PSYCH REHAB - NSDCNEXTLEVELWHO_PSY_ALL_CORE_FT
SW to [facility] [phone] SW to [facility] [phone] and [facility] [phone].

## 2024-06-26 NOTE — BH INPATIENT PSYCHIATRY PROGRESS NOTE - NSBHFUPINTERVALHXFT_PSY_A_CORE
Pt seen and evaluated, chart reviewed. As per nursing report, pt c/o insomnia overnight, PRN trazodone given with fair results. On evaluation, pt endorses continued low mood and anxious ruminations on events leading to admission. Today he perseverates that he is going to be kicked out of his skilled nursing house and will live on the streets. He reports poor sleep 2/2 anxious ruminations. Pt noted to become more tremulous while discussing event. When told of treatment team's last contact with [facility] resulting in pt able to return, pt states "yeah to  my things". He reports limited appetite, denies AH or VH, denies current safety concerns. He admits to feelings of hopelessness and passive SI, he denies intent and plan, agrees to safety plan. Pt has been isolative to room, encouraged groups/DBT. Denies negative side effects of medications.

## 2024-06-26 NOTE — BH INPATIENT PSYCHIATRY PROGRESS NOTE - NSBHMETABOLIC_PSY_ALL_CORE_FT
BMI: BMI (kg/m2): 27.7 (06-20-24 @ 19:28)  HbA1c: A1C with Estimated Average Glucose Result: 6.3 % (06-22-24 @ 08:25)    Glucose:   BP: 110/72 (06-25-24 @ 15:27) (110/72 - 128/78)Vital Signs Last 24 Hrs  T(C): 36.9 (06-25-24 @ 15:27), Max: 36.9 (06-25-24 @ 15:27)  T(F): 98.5 (06-25-24 @ 15:27), Max: 98.5 (06-25-24 @ 15:27)  HR: 84 (06-25-24 @ 15:27) (84 - 84)  BP: 110/72 (06-25-24 @ 15:27) (110/72 - 110/72)  BP(mean): --  RR: 18 (06-25-24 @ 15:27) (18 - 18)  SpO2: --      Lipid Panel: Date/Time: 06-22-24 @ 08:25  Cholesterol, Serum: 274  LDL Cholesterol Calculated: 213  HDL Cholesterol, Serum: 36  Total Cholesterol/HDL Ration Measurement: --  Triglycerides, Serum: 123

## 2024-06-26 NOTE — BH SAFETY PLAN - STEP 1 WARNING SIGNS
. Area H Indication Text: Tumors in this location are included in Area H (eyelids, eyebrows, nose, lips, chin, ear, pre-auricular, post-auricular, temple, genitalia, hands, feet, ankles and areola).  Tissue conservation is critical in these anatomic locations.

## 2024-06-26 NOTE — BH INPATIENT PSYCHIATRY PROGRESS NOTE - CURRENT MEDICATION
MEDICATIONS  (STANDING):  atorvastatin 40 milliGRAM(s) Oral at bedtime  budesonide 160 MICROgram(s)/formoterol 4.5 MICROgram(s) Inhaler 2 Puff(s) Inhalation two times a day  nicotine -  14 mG/24Hr(s) Patch 1 Patch Transdermal daily  QUEtiapine 50 milliGRAM(s) Oral daily  QUEtiapine 250 milliGRAM(s) Oral at bedtime  senna 1 Tablet(s) Oral once    MEDICATIONS  (PRN):  acetaminophen     Tablet .. 650 milliGRAM(s) Oral every 6 hours PRN Temp greater or equal to 38C (100.4F), Moderate Pain (4 - 6)  haloperidol     Tablet 5 milliGRAM(s) Oral every 6 hours PRN agitation  hydrOXYzine hydrochloride 50 milliGRAM(s) Oral every 6 hours PRN anxiety/insomnia  LORazepam     Tablet 2 milliGRAM(s) Oral every 6 hours PRN combative bx  nicotine  Polacrilex Gum 2 milliGRAM(s) Oral every 2 hours PRN Smoking Cessation  traZODone 50 milliGRAM(s) Oral at bedtime PRN insomnia   MEDICATIONS  (STANDING):  atorvastatin 40 milliGRAM(s) Oral at bedtime  budesonide 160 MICROgram(s)/formoterol 4.5 MICROgram(s) Inhaler 2 Puff(s) Inhalation two times a day  nicotine -  14 mG/24Hr(s) Patch 1 Patch Transdermal daily  QUEtiapine 300 milliGRAM(s) Oral at bedtime  senna 1 Tablet(s) Oral once    MEDICATIONS  (PRN):  acetaminophen     Tablet .. 650 milliGRAM(s) Oral every 6 hours PRN Temp greater or equal to 38C (100.4F), Moderate Pain (4 - 6)  haloperidol     Tablet 5 milliGRAM(s) Oral every 6 hours PRN agitation  hydrOXYzine hydrochloride 50 milliGRAM(s) Oral every 6 hours PRN anxiety/insomnia  LORazepam     Tablet 2 milliGRAM(s) Oral every 6 hours PRN combative bx  nicotine  Polacrilex Gum 2 milliGRAM(s) Oral every 2 hours PRN Smoking Cessation  traZODone 50 milliGRAM(s) Oral at bedtime PRN insomnia

## 2024-06-26 NOTE — BH DISCHARGE NOTE NURSING/SOCIAL WORK/PSYCH REHAB - NSBHDCADDR1FT_A_CORE
I examined patient and reviewed visit with the resident and agree with her assessment and plan   [address] [id number] [address] [facility], [address]  [place] [id number]

## 2024-06-26 NOTE — BH SAFETY PLAN - WARNING SIGN 1
In the course of a day, I find myself smoking immensely more then usual. The smoking calms me, yet I know its not healthy the urge is unbearable.

## 2024-06-26 NOTE — BH DISCHARGE NOTE NURSING/SOCIAL WORK/PSYCH REHAB - PATIENT PORTAL LINK FT
You can access the FollowMyHealth Patient Portal offered by [facility] by registering at the following website: [email or web address]. By joining [employer]’s FollowMyHealth portal, you will also be able to view your health information using other applications (apps) compatible with our system.

## 2024-06-26 NOTE — BH INPATIENT PSYCHIATRY PROGRESS NOTE - OTHER
patient demonstrates bilateral large amplitude UE/LE shaking, able to control voluntarily, not tremulous

## 2024-06-26 NOTE — BH SAFETY PLAN - WARNING SIGN 2
With a thump in my heart I begin to pace furiously. The thoughts in my mind engulf my serenity thus,  I  pace looking for a path to tranquility.

## 2024-06-26 NOTE — BH SAFETY PLAN - CRISIS CLINICIAN NAME 1
Copy of nuclear study 3/10/2023 faxed for patient's DOT appointment to [phone].  
Patient called requesting a copy of Nuclear stress test 3-10-23  be faxed to [facility] for OV today at 2:15 pm.   Fax # [phone],    Team to fax.   
Family [facility] Carlos Alberto Moya

## 2024-06-26 NOTE — BH SAFETY PLAN - WARNING SIGN 3
There are times I find solitude in my isolation from others. I know it not a great plan or at all productive but I do it nonetheless

## 2024-06-27 PROCEDURE — 99232 SBSQ HOSP IP/OBS MODERATE 35: CPT

## 2024-06-27 RX ORDER — SENNOSIDES 8.6 MG
2 TABLET ORAL AT BEDTIME
Refills: 0 | Status: DISCONTINUED | OUTPATIENT
Start: 2024-06-27 | End: 2024-07-08

## 2024-06-27 RX ORDER — POLYETHYLENE GLYCOL 3350 1 G/G
17 POWDER ORAL DAILY
Refills: 0 | Status: DISCONTINUED | OUTPATIENT
Start: 2024-06-27 | End: 2024-07-08

## 2024-06-27 RX ADMIN — NICOTINE POLACRILEX 1 PATCH: 2 LOZENGE ORAL at 08:22

## 2024-06-27 RX ADMIN — POLYETHYLENE GLYCOL 3350 17 GRAM(S): 1 POWDER ORAL at 09:54

## 2024-06-27 RX ADMIN — NICOTINE POLACRILEX 1 PATCH: 2 LOZENGE ORAL at 06:29

## 2024-06-27 RX ADMIN — Medication 300 MILLIGRAM(S): at 20:14

## 2024-06-27 RX ADMIN — Medication 100 MILLIGRAM(S): at 08:23

## 2024-06-27 RX ADMIN — ATORVASTATIN CALCIUM 40 MILLIGRAM(S): 20 TABLET, FILM COATED ORAL at 20:14

## 2024-06-27 RX ADMIN — Medication 2 PUFF(S): at 20:13

## 2024-06-27 RX ADMIN — Medication 2 TABLET(S): at 20:14

## 2024-06-27 NOTE — BH INPATIENT PSYCHIATRY PROGRESS NOTE - NSBHCHARTREVIEWVS_PSY_A_CORE FT
Vital Signs Last 24 Hrs  T(C): 36.4 (06-27-24 @ 08:06), Max: 36.7 (06-26-24 @ 15:29)  T(F): 97.6 (06-27-24 @ 08:06), Max: 98 (06-26-24 @ 15:29)  HR: 89 (06-27-24 @ 08:06) (83 - 89)  BP: 128/85 (06-27-24 @ 08:06) (95/66 - 128/85)  BP(mean): --  RR: 18 (06-27-24 @ 08:06) (16 - 18)  SpO2: --

## 2024-06-27 NOTE — BH INPATIENT PSYCHIATRY PROGRESS NOTE - NSBHFUPINTERVALHXFT_PSY_A_CORE
Pt seen and evaluated, chart reviewed. As per nursing report, no acute events overnight, no PRNs. On evaluation, pt presents more focused and engaged than prior evaluations, improved grooming. He reports he is feeling "a bit better". He attributes improvement to improved sleep overnight, states he was better able to fall/maintain sleep. He also reports feeling "more positive", appears future-oriented, endorses goal to return to his retirement house and resume duties. He reports decrease in anxious ruminations on fears of being kicked out/homeless and legal issues. He reports improving appetite, c/o constipation, bowel regimen started. He denies AVH, paranoia. He reports decrease in passive SI, denies intent and plan, able to safety plan. He denies negative side effects of medications. He is more visible on unit, encouraged groups/DBT. Pt seen and evaluated, chart reviewed. As per nursing report, no acute events overnight, no PRNs. On evaluation, pt presents more focused and engaged than prior evaluations, improved grooming. He reports he is feeling "a bit better". He attributes improvement to improved sleep overnight, states he was better able to fall/maintain sleep. He also reports feeling "more positive", appears future-oriented, endorses goal to return to his care home house and resume duties. He reports decrease in anxious ruminations on fears of being kicked out/homeless and legal issues. He reports improving appetite, c/o constipation, bowel regimen started. He denies AVH, paranoia. He reports decrease in passive SI, denies intent and plan, able to safety plan. He denies negative side effects of medications. He is more visible on unit, encouraged groups/DBT.  Attempted to speak with pt's sister, Yessenia ([phone]) with pt's permission- left VM and callback #

## 2024-06-27 NOTE — BH INPATIENT PSYCHIATRY PROGRESS NOTE - NSBHMETABOLIC_PSY_ALL_CORE_FT
BMI: BMI (kg/m2): 27.7 (06-20-24 @ 19:28)  HbA1c: A1C with Estimated Average Glucose Result: 6.3 % (06-22-24 @ 08:25)    Glucose:   BP: 128/85 (06-27-24 @ 08:06) (95/66 - 133/84)Vital Signs Last 24 Hrs  T(C): 36.4 (06-27-24 @ 08:06), Max: 36.7 (06-26-24 @ 15:29)  T(F): 97.6 (06-27-24 @ 08:06), Max: 98 (06-26-24 @ 15:29)  HR: 89 (06-27-24 @ 08:06) (83 - 89)  BP: 128/85 (06-27-24 @ 08:06) (95/66 - 128/85)  BP(mean): --  RR: 18 (06-27-24 @ 08:06) (16 - 18)  SpO2: --      Lipid Panel: Date/Time: 06-22-24 @ 08:25  Cholesterol, Serum: 274  LDL Cholesterol Calculated: 213  HDL Cholesterol, Serum: 36  Total Cholesterol/HDL Ration Measurement: --  Triglycerides, Serum: 123

## 2024-06-27 NOTE — BH INPATIENT PSYCHIATRY PROGRESS NOTE - CURRENT MEDICATION
MEDICATIONS  (STANDING):  atorvastatin 40 milliGRAM(s) Oral at bedtime  budesonide 160 MICROgram(s)/formoterol 4.5 MICROgram(s) Inhaler 2 Puff(s) Inhalation two times a day  nicotine -  14 mG/24Hr(s) Patch 1 Patch Transdermal daily  QUEtiapine 300 milliGRAM(s) Oral at bedtime  QUEtiapine 100 milliGRAM(s) Oral daily  senna 2 Tablet(s) Oral at bedtime    MEDICATIONS  (PRN):  acetaminophen     Tablet .. 650 milliGRAM(s) Oral every 6 hours PRN Temp greater or equal to 38C (100.4F), Moderate Pain (4 - 6)  haloperidol     Tablet 5 milliGRAM(s) Oral every 6 hours PRN agitation  hydrOXYzine hydrochloride 50 milliGRAM(s) Oral every 6 hours PRN anxiety/insomnia  LORazepam     Tablet 2 milliGRAM(s) Oral every 6 hours PRN combative bx  nicotine  Polacrilex Gum 2 milliGRAM(s) Oral every 2 hours PRN Smoking Cessation  polyethylene glycol 3350 17 Gram(s) Oral daily PRN Constipation  traZODone 50 milliGRAM(s) Oral at bedtime PRN insomnia

## 2024-06-27 NOTE — BH INPATIENT PSYCHIATRY PROGRESS NOTE - NSBHASSESSSUMMFT_PSY_ALL_CORE
65 y/o male, domiciled at longterm house, non-caregiver, self-reported PPHx of bipolar disorder, no prior admissions, denies hx of SA/SIB, PMHx of HLD, hx of polysubstance use (tobacco, alcohol, crack; reportedly sober x7 months), denies hx of withdrawal seizures/DTs , +legal hx for DWIs, BIB EMS activated by self for SI. Pt endorsed worsening depressive sx in last month after quitting stressful job, and worry of being kicked out of his longterm house d/t lack of participation. He endorsed poor sleep, anhedonia, does not want to get out of bed, low energy, decreased appetite. He also reports passive SI with thoughts of using a knife to his neck or a gun if he had one. UDS negative. Of note, pt with significant family hx of bipolar d/o and endorses episodes of likely hypomania, although difficult to ascertain whether episodes occurred during periods of sobriety. Pt will likely benefit from IPP at this time.     On evaluation, pt presents more focused and engaged than prior evaluations, improving ADLs. He endorses decrease in anxious ruminations on events leading to admission and irrational fears (legal issues, being kicked out/homeless). He reports overall improving appetite and sleep, reports decrease in passive SI and appears more future-oriented. Has been more visible on unit and not a behavioral concern.    #Bipolar depression  -c/w seroquel 100 mg qAM/ 300 mg qhs (6/27)  -encourage groups/DBT    #AUD in early remission  #Hx cocaine abuse  -CATCH consult    #Agitation  -for agitation not amenable to verbal redirection, may give haldol 5 mg q6h prn and ativan 2 mg q6h prn with escalation to IM if pt is refusing PO and is an acute danger to self or/and others with repeat EKG to ensure QTc <500 ms

## 2024-06-28 PROCEDURE — 99232 SBSQ HOSP IP/OBS MODERATE 35: CPT

## 2024-06-28 RX ORDER — LORAZEPAM 0.5 MG
2 TABLET ORAL EVERY 6 HOURS
Refills: 0 | Status: DISCONTINUED | OUTPATIENT
Start: 2024-06-29 | End: 2024-06-29

## 2024-06-28 RX ADMIN — Medication 2 PUFF(S): at 08:12

## 2024-06-28 RX ADMIN — ATORVASTATIN CALCIUM 40 MILLIGRAM(S): 20 TABLET, FILM COATED ORAL at 20:00

## 2024-06-28 RX ADMIN — Medication 100 MILLIGRAM(S): at 08:12

## 2024-06-28 RX ADMIN — Medication 2 TABLET(S): at 20:00

## 2024-06-28 RX ADMIN — Medication 300 MILLIGRAM(S): at 20:00

## 2024-06-28 RX ADMIN — Medication 2 PUFF(S): at 20:00

## 2024-06-28 NOTE — BH INPATIENT PSYCHIATRY PROGRESS NOTE - NSBHMETABOLIC_PSY_ALL_CORE_FT
BMI: BMI (kg/m2): 27.7 (06-20-24 @ 19:28)  HbA1c: A1C with Estimated Average Glucose Result: 6.3 % (06-22-24 @ 08:25)    Glucose:   BP: 128/87 (06-28-24 @ 07:59) (95/66 - 133/84)Vital Signs Last 24 Hrs  T(C): 36.4 (06-28-24 @ 07:59), Max: 36.9 (06-27-24 @ 15:19)  T(F): 97.6 (06-28-24 @ 07:59), Max: 98.4 (06-27-24 @ 15:19)  HR: 69 (06-28-24 @ 07:59) (69 - 89)  BP: 128/87 (06-28-24 @ 07:59) (101/67 - 128/87)  BP(mean): --  RR: 16 (06-28-24 @ 07:59) (16 - 18)  SpO2: --      Lipid Panel: Date/Time: 06-22-24 @ 08:25  Cholesterol, Serum: 274  LDL Cholesterol Calculated: 213  HDL Cholesterol, Serum: 36  Total Cholesterol/HDL Ration Measurement: --  Triglycerides, Serum: 123

## 2024-06-28 NOTE — BH TREATMENT PLAN - NSTXPATIENTPARTICIPATE_PSY_ALL_CORE
Patient participated in identification of needs/problems/goals for treatment Patient participated in identification of needs/problems/goals for treatment/Patient participated in development of after care plan

## 2024-06-28 NOTE — BH INPATIENT PSYCHIATRY PROGRESS NOTE - NSBHCHARTREVIEWVS_PSY_A_CORE FT
Vital Signs Last 24 Hrs  T(C): 36.4 (06-28-24 @ 07:59), Max: 36.9 (06-27-24 @ 15:19)  T(F): 97.6 (06-28-24 @ 07:59), Max: 98.4 (06-27-24 @ 15:19)  HR: 69 (06-28-24 @ 07:59) (69 - 89)  BP: 128/87 (06-28-24 @ 07:59) (101/67 - 128/87)  BP(mean): --  RR: 16 (06-28-24 @ 07:59) (16 - 18)  SpO2: --

## 2024-06-28 NOTE — BH INPATIENT PSYCHIATRY PROGRESS NOTE - NSBHFUPINTERVALHXFT_PSY_A_CORE
Patient seen and evaluated. As per nursing report no acute events. On approach patient states he is feeling better but states he is still worried about housing issues. Believed he will have no place to go when he gets discharge. Told writer the  was looking into it and was told he could return so that made him feel better. Verbalizes sleeping better and his appetite has improved. Still states he has passive SI. States “the thoughts are there but I know I wont do anything”. Denies and intent or plan. Patient able to contract for safety. Denies any A/V hallucinations. Patient visible on the unit watching T.V. in the T.V. room. Encouraged to engage with peers and attend groups. Patient seen and evaluated. As per nursing report no acute events. On approach patient states he is feeling better but states he is still worried about housing issues. Believed he will have no place to go when he gets discharge. Told writer the  was looking into it and was told he could return so that made him feel better. Verbalizes sleeping better and his appetite has improved. Still states he has passive SI. States “the thoughts are there but I know I wont do anything”. Denies and intent or plan. Patient able to contract for safety. Denies any A/V hallucinations. Patient visible on the unit watching T.V. in the T.V. room. Patient visible on the unit engaging with peers and attending groups.

## 2024-06-28 NOTE — BH INPATIENT PSYCHIATRY PROGRESS NOTE - NSBHASSESSSUMMFT_PSY_ALL_CORE
63 y/o male, domiciled at [facility] house, non-caregiver, self-reported PPHx of bipolar disorder, no prior admissions, denies hx of SA/SIB, PMHx of HLD, hx of polysubstance use (tobacco, alcohol, crack; reportedly sober x7 months), denies hx of withdrawal seizures/DTs , +legal hx for DWIs, BIB EMS activated by self for SI. Pt endorsed worsening depressive sx in last month after quitting stressful job, and worry of being kicked out of his [facility] house d/t lack of participation. He endorsed poor sleep, anhedonia, does not want to get out of bed, low energy, decreased appetite. He also reports passive SI with thoughts of using a knife to his neck or a gun if he had one. UDS negative. Of note, pt with significant family hx of bipolar d/o and endorses episodes of likely hypomania, although difficult to ascertain whether episodes occurred during periods of sobriety. Pt will likely benefit from IPP at this time.     Patient seen and evaluated. As per nursing report no acute events. On approach patient states he is feeling better but states he is still worried about housing issues. Believed he will have no place to go when he gets discharge. Told writer the  was looking into it and was told he could return so that made him feel better. Verbalizes sleeping better and his appetite has improved. Still states he has passive SI. States “the thoughts are there but I know I wont do anything”. Denies and intent or plan. Patient able to contract for safety. Denies any A/V hallucinations. Patient visible on the unit watching T.V. in the T.V. room. Encouraged to engage with peers and attend groups.    #Bipolar depression  -c/w seroquel 100 mg qAM/ 300 mg qhs (6/27)  -encourage groups/DBT    #AUD in early remission  #Hx cocaine abuse  -CATCH consult    #Agitation  -for agitation not amenable to verbal redirection, may give haldol 5 mg q6h prn and ativan 2 mg q6h prn with escalation to IM if pt is refusing PO and is an acute danger to self or/and others with repeat EKG to ensure QTc <500 ms 65 y/o male, domiciled at shelter house, non-caregiver, self-reported PPHx of bipolar disorder, no prior admissions, denies hx of SA/SIB, PMHx of HLD, hx of polysubstance use (tobacco, alcohol, crack; reportedly sober x7 months), denies hx of withdrawal seizures/DTs , +legal hx for DWIs, BIB EMS activated by self for SI. Pt endorsed worsening depressive sx in last month after quitting stressful job, and worry of being kicked out of his shelter house d/t lack of participation. He endorsed poor sleep, anhedonia, does not want to get out of bed, low energy, decreased appetite. He also reports passive SI with thoughts of using a knife to his neck or a gun if he had one. UDS negative. Of note, pt with significant family hx of bipolar d/o and endorses episodes of likely hypomania, although difficult to ascertain whether episodes occurred during periods of sobriety. Pt will likely benefit from IPP at this time.     Patient seen and evaluated. As per nursing report no acute events. On approach patient states he is feeling better but states he is still worried about housing issues. Believed he will have no place to go when he gets discharge. Told writer the  was looking into it and was told he could return so that made him feel better. Verbalizes sleeping better and his appetite has improved. Still states he has passive SI. States “the thoughts are there but I know I wont do anything”. Denies and intent or plan. Patient able to contract for safety. Denies any A/V hallucinations. Patient visible on the unit watching T.V. in the T.V. room. Patient visible on the unit engaging with peers and attending groups.    #Bipolar depression  -c/w seroquel 100 mg qAM/ 300 mg qhs (6/27)  -encourage groups/DBT    #AUD in early remission  #Hx cocaine abuse  -CATCH consult    #Agitation  -for agitation not amenable to verbal redirection, may give haldol 5 mg q6h prn and ativan 2 mg q6h prn with escalation to IM if pt is refusing PO and is an acute danger to self or/and others with repeat EKG to ensure QTc <500 ms

## 2024-06-29 RX ADMIN — ATORVASTATIN CALCIUM 40 MILLIGRAM(S): 20 TABLET, FILM COATED ORAL at 20:01

## 2024-06-29 RX ADMIN — Medication 300 MILLIGRAM(S): at 20:01

## 2024-06-29 RX ADMIN — Medication 100 MILLIGRAM(S): at 07:54

## 2024-06-29 RX ADMIN — Medication 2 PUFF(S): at 20:02

## 2024-06-29 RX ADMIN — Medication 2 PUFF(S): at 11:36

## 2024-06-29 RX ADMIN — Medication 2 TABLET(S): at 20:01

## 2024-06-30 RX ADMIN — Medication 2 PUFF(S): at 07:57

## 2024-06-30 RX ADMIN — TRAZODONE HYDROCHLORIDE 50 MILLIGRAM(S): 50 TABLET, FILM COATED ORAL at 20:09

## 2024-06-30 RX ADMIN — ATORVASTATIN CALCIUM 40 MILLIGRAM(S): 20 TABLET, FILM COATED ORAL at 20:08

## 2024-06-30 RX ADMIN — Medication 300 MILLIGRAM(S): at 20:08

## 2024-06-30 RX ADMIN — Medication 2 PUFF(S): at 20:08

## 2024-06-30 RX ADMIN — Medication 100 MILLIGRAM(S): at 07:57

## 2024-06-30 RX ADMIN — Medication 2 TABLET(S): at 20:09

## 2024-07-01 PROCEDURE — 99232 SBSQ HOSP IP/OBS MODERATE 35: CPT

## 2024-07-01 RX ADMIN — Medication 2 TABLET(S): at 20:12

## 2024-07-01 RX ADMIN — Medication 300 MILLIGRAM(S): at 20:12

## 2024-07-01 RX ADMIN — Medication 2 PUFF(S): at 08:36

## 2024-07-01 RX ADMIN — Medication 100 MILLIGRAM(S): at 08:36

## 2024-07-01 RX ADMIN — ATORVASTATIN CALCIUM 40 MILLIGRAM(S): 20 TABLET, FILM COATED ORAL at 20:12

## 2024-07-01 NOTE — BH INPATIENT PSYCHIATRY PROGRESS NOTE - NSBHFUPINTERVALHXFT_PSY_A_CORE
Patient seen and evaluated. As per nursing report no acute events. On approach patient states he is feeling better but continues to be preoccupied about housing issues. Verbalizes sleeping better and his appetite has improved. Denies any suicidal/homicidal ideations. Patient able to contract for safety. Denies any A/V hallucinations. Patient visible on the unit engaging with peers and attending groups. Patient seen and evaluated. As per nursing report no acute events. On approach patient states he is feeling better but continues to be preoccupied about housing issues, although less so. Verbalizes sleeping better and his appetite has improved. Denies any suicidal/homicidal ideations. Verbalizes feeling good on this medication dose at this time. No medication changes at this time. Will continue to monitor and titrate accordingly.  Patient able to contract for safety. Denies any A/V hallucinations. Patient visible on the unit engaging with peers and attending groups.

## 2024-07-01 NOTE — BH INPATIENT PSYCHIATRY PROGRESS NOTE - CURRENT MEDICATION
MEDICATIONS  (STANDING):  atorvastatin 40 milliGRAM(s) Oral at bedtime  budesonide 160 MICROgram(s)/formoterol 4.5 MICROgram(s) Inhaler 2 Puff(s) Inhalation two times a day  nicotine -  14 mG/24Hr(s) Patch 1 Patch Transdermal daily  QUEtiapine 100 milliGRAM(s) Oral daily  QUEtiapine 300 milliGRAM(s) Oral at bedtime  senna 2 Tablet(s) Oral at bedtime    MEDICATIONS  (PRN):  acetaminophen     Tablet .. 650 milliGRAM(s) Oral every 6 hours PRN Temp greater or equal to 38C (100.4F), Moderate Pain (4 - 6)  haloperidol     Tablet 5 milliGRAM(s) Oral every 6 hours PRN agitation  hydrOXYzine hydrochloride 50 milliGRAM(s) Oral every 6 hours PRN anxiety/insomnia  LORazepam     Tablet 2 milliGRAM(s) Oral every 6 hours PRN combative bx  nicotine  Polacrilex Gum 2 milliGRAM(s) Oral every 2 hours PRN Smoking Cessation  polyethylene glycol 3350 17 Gram(s) Oral daily PRN Constipation  traZODone 50 milliGRAM(s) Oral at bedtime PRN insomnia   MEDICATIONS  (STANDING):  atorvastatin 40 milliGRAM(s) Oral at bedtime  budesonide 160 MICROgram(s)/formoterol 4.5 MICROgram(s) Inhaler 2 Puff(s) Inhalation two times a day  nicotine -  14 mG/24Hr(s) Patch 1 Patch Transdermal daily  QUEtiapine 300 milliGRAM(s) Oral at bedtime  QUEtiapine 100 milliGRAM(s) Oral daily  senna 2 Tablet(s) Oral at bedtime    MEDICATIONS  (PRN):  acetaminophen     Tablet .. 650 milliGRAM(s) Oral every 6 hours PRN Temp greater or equal to 38C (100.4F), Moderate Pain (4 - 6)  haloperidol     Tablet 5 milliGRAM(s) Oral every 6 hours PRN agitation  hydrOXYzine hydrochloride 50 milliGRAM(s) Oral every 6 hours PRN anxiety/insomnia  LORazepam     Tablet 2 milliGRAM(s) Oral every 6 hours PRN combative bx  nicotine  Polacrilex Gum 2 milliGRAM(s) Oral every 2 hours PRN Smoking Cessation  polyethylene glycol 3350 17 Gram(s) Oral daily PRN Constipation  traZODone 50 milliGRAM(s) Oral at bedtime PRN insomnia

## 2024-07-01 NOTE — BH INPATIENT PSYCHIATRY PROGRESS NOTE - NSBHASSESSSUMMFT_PSY_ALL_CORE
65 y/o male, domiciled at residential house, non-caregiver, self-reported PPHx of bipolar disorder, no prior admissions, denies hx of SA/SIB, PMHx of HLD, hx of polysubstance use (tobacco, alcohol, crack; reportedly sober x7 months), denies hx of withdrawal seizures/DTs , +legal hx for DWIs, BIB EMS activated by self for SI. Pt endorsed worsening depressive sx in last month after quitting stressful job, and worry of being kicked out of his residential house d/t lack of participation. He endorsed poor sleep, anhedonia, does not want to get out of bed, low energy, decreased appetite. He also reports passive SI with thoughts of using a knife to his neck or a gun if he had one. UDS negative. Of note, pt with significant family hx of bipolar d/o and endorses episodes of likely hypomania, although difficult to ascertain whether episodes occurred during periods of sobriety. Pt will likely benefit from IPP at this time.     Patient seen and evaluated. As per nursing report no acute events. On approach patient states he is feeling better but continues to be preoccupied about housing issues. Verbalizes sleeping better and his appetite has improved. Denies any suicidal/homicidal ideations. Patient able to contract for safety. Denies any A/V hallucinations. Patient visible on the unit engaging with peers and attending groups.    #Bipolar depression  -c/w seroquel 100 mg qAM/ 300 mg qhs (6/27)  -encourage groups/DBT    #AUD in early remission  #Hx cocaine abuse  -CATCH consult    #Agitation  -for agitation not amenable to verbal redirection, may give haldol 5 mg q6h prn and ativan 2 mg q6h prn with escalation to IM if pt is refusing PO and is an acute danger to self or/and others with repeat EKG to ensure QTc <500 ms 63 y/o male, domiciled at [facility] house, non-caregiver, self-reported PPHx of bipolar disorder, no prior admissions, denies hx of SA/SIB, PMHx of HLD, hx of polysubstance use (tobacco, alcohol, crack; reportedly sober x7 months), denies hx of withdrawal seizures/DTs , +legal hx for DWIs, BIB EMS activated by self for SI. Pt endorsed worsening depressive sx in last month after quitting stressful job, and worry of being kicked out of his snf house d/t lack of participation. He endorsed poor sleep, anhedonia, does not want to get out of bed, low energy, decreased appetite. He also reports passive SI with thoughts of using a knife to his neck or a gun if he had one. UDS negative. Of note, pt with significant family hx of bipolar d/o and endorses episodes of likely hypomania, although difficult to ascertain whether episodes occurred during periods of sobriety. Pt will likely benefit from IPP at this time.     Patient seen and evaluated. As per nursing report no acute events. On approach patient states he is feeling better but continues to be preoccupied about housing issues, although less so. Verbalizes sleeping better and his appetite has improved. Denies any suicidal/homicidal ideations. Verbalizes feeling good on this medication dose at this time. No medication changes at this time. Will continue to monitor and titrate accordingly.  Patient able to contract for safety. Denies any A/V hallucinations. Patient visible on the unit engaging with peers and attending groups.    #Bipolar depression  -c/w seroquel 100 mg qAM/ 300 mg qhs (6/27)  -encourage groups/DBT    #AUD in early remission  #Hx cocaine abuse  -CATCH consult    #Agitation  -for agitation not amenable to verbal redirection, may give haldol 5 mg q6h prn and ativan 2 mg q6h prn with escalation to IM if pt is refusing PO and is an acute danger to self or/and others with repeat EKG to ensure QTc <500 ms clear to auscultation bilaterally

## 2024-07-01 NOTE — BH INPATIENT PSYCHIATRY PROGRESS NOTE - NSBHCHARTREVIEWVS_PSY_A_CORE FT
Vital Signs Last 24 Hrs  T(C): 36.5 (07-01-24 @ 08:00), Max: 36.7 (06-30-24 @ 15:43)  T(F): 97.7 (07-01-24 @ 08:00), Max: 98 (06-30-24 @ 15:43)  HR: 76 (07-01-24 @ 08:00) (74 - 76)  BP: 142/85 (07-01-24 @ 08:00) (114/78 - 142/85)  BP(mean): --  RR: 18 (07-01-24 @ 08:00) (18 - 20)  SpO2: --

## 2024-07-01 NOTE — BH INPATIENT PSYCHIATRY PROGRESS NOTE - NSBHMETABOLIC_PSY_ALL_CORE_FT
BMI: BMI (kg/m2): 27.7 (06-20-24 @ 19:28)  HbA1c: A1C with Estimated Average Glucose Result: 6.3 % (06-22-24 @ 08:25)    Glucose:   BP: 142/85 (07-01-24 @ 08:00) (100/71 - 142/85)Vital Signs Last 24 Hrs  T(C): 36.5 (07-01-24 @ 08:00), Max: 36.7 (06-30-24 @ 15:43)  T(F): 97.7 (07-01-24 @ 08:00), Max: 98 (06-30-24 @ 15:43)  HR: 76 (07-01-24 @ 08:00) (74 - 76)  BP: 142/85 (07-01-24 @ 08:00) (114/78 - 142/85)  BP(mean): --  RR: 18 (07-01-24 @ 08:00) (18 - 20)  SpO2: --      Lipid Panel: Date/Time: 06-22-24 @ 08:25  Cholesterol, Serum: 274  LDL Cholesterol Calculated: 213  HDL Cholesterol, Serum: 36  Total Cholesterol/HDL Ration Measurement: --  Triglycerides, Serum: 123

## 2024-07-02 PROCEDURE — 99232 SBSQ HOSP IP/OBS MODERATE 35: CPT

## 2024-07-02 RX ADMIN — Medication 2 TABLET(S): at 20:15

## 2024-07-02 RX ADMIN — Medication 100 MILLIGRAM(S): at 08:42

## 2024-07-02 RX ADMIN — ATORVASTATIN CALCIUM 40 MILLIGRAM(S): 20 TABLET, FILM COATED ORAL at 20:15

## 2024-07-02 RX ADMIN — Medication 400 MILLIGRAM(S): at 20:15

## 2024-07-02 NOTE — BH INPATIENT PSYCHIATRY PROGRESS NOTE - NSBHFUPINTERVALHXFT_PSY_A_CORE
Pt seen and evaluated, chart reviewed. As per nursing report, pt c/o insomnia, PRN trazodone given with fair response. On evaluation, pt presents in bed, sits up during interview, is with improved grooming compared to prior evaluation. He continues with some dysphoric and constricted affect, states he is feeling "more positive I guess". He admits to significant anxious ruminations on whether he will be able to maintain wellness on discharge and whether he will be kicked out of his residence. He endorses disrupted sleep with multiple awakenings, denies feelings tired. He endorses improving appetite and resolution of constipation. He denies AVH, paranoia. He reports decrease in passive SI, denies intent and plan, able to safety plan. He denies negative side effects of medications. He is more visible on unit, encouraged groups/DBT.  Spoke with pt's sister, Yessenia ([phone])- updated on POC.

## 2024-07-02 NOTE — BH INPATIENT PSYCHIATRY PROGRESS NOTE - NSBHMETABOLIC_PSY_ALL_CORE_FT
BMI: BMI (kg/m2): 27.7 (06-20-24 @ 19:28)  HbA1c: A1C with Estimated Average Glucose Result: 6.3 % (06-22-24 @ 08:25)    Glucose:   BP: 120/87 (07-02-24 @ 08:56) (95/67 - 142/85)Vital Signs Last 24 Hrs  T(C): 36.3 (07-02-24 @ 08:56), Max: 36.6 (07-01-24 @ 15:00)  T(F): 97.4 (07-02-24 @ 08:56), Max: 97.9 (07-01-24 @ 15:00)  HR: 76 (07-02-24 @ 08:56) (76 - 81)  BP: 120/87 (07-02-24 @ 08:56) (95/67 - 120/87)  BP(mean): --  RR: 18 (07-02-24 @ 08:56) (18 - 18)  SpO2: --      Lipid Panel: Date/Time: 06-22-24 @ 08:25  Cholesterol, Serum: 274  LDL Cholesterol Calculated: 213  HDL Cholesterol, Serum: 36  Total Cholesterol/HDL Ration Measurement: --  Triglycerides, Serum: 123

## 2024-07-02 NOTE — BH INPATIENT PSYCHIATRY PROGRESS NOTE - CURRENT MEDICATION
MEDICATIONS  (STANDING):  atorvastatin 40 milliGRAM(s) Oral at bedtime  budesonide 160 MICROgram(s)/formoterol 4.5 MICROgram(s) Inhaler 2 Puff(s) Inhalation two times a day  nicotine -  14 mG/24Hr(s) Patch 1 Patch Transdermal daily  QUEtiapine 400 milliGRAM(s) Oral at bedtime  QUEtiapine 100 milliGRAM(s) Oral daily  senna 2 Tablet(s) Oral at bedtime    MEDICATIONS  (PRN):  acetaminophen     Tablet .. 650 milliGRAM(s) Oral every 6 hours PRN Temp greater or equal to 38C (100.4F), Moderate Pain (4 - 6)  haloperidol     Tablet 5 milliGRAM(s) Oral every 6 hours PRN agitation  hydrOXYzine hydrochloride 50 milliGRAM(s) Oral every 6 hours PRN anxiety/insomnia  LORazepam     Tablet 2 milliGRAM(s) Oral every 6 hours PRN combative bx  nicotine  Polacrilex Gum 2 milliGRAM(s) Oral every 2 hours PRN Smoking Cessation  polyethylene glycol 3350 17 Gram(s) Oral daily PRN Constipation  traZODone 50 milliGRAM(s) Oral at bedtime PRN insomnia

## 2024-07-02 NOTE — BH INPATIENT PSYCHIATRY PROGRESS NOTE - NSBHCHARTREVIEWVS_PSY_A_CORE FT
Vital Signs Last 24 Hrs  T(C): 36.3 (07-02-24 @ 08:56), Max: 36.6 (07-01-24 @ 15:00)  T(F): 97.4 (07-02-24 @ 08:56), Max: 97.9 (07-01-24 @ 15:00)  HR: 76 (07-02-24 @ 08:56) (76 - 81)  BP: 120/87 (07-02-24 @ 08:56) (95/67 - 120/87)  BP(mean): --  RR: 18 (07-02-24 @ 08:56) (18 - 18)  SpO2: --

## 2024-07-02 NOTE — BH INPATIENT PSYCHIATRY PROGRESS NOTE - NSBHASSESSSUMMFT_PSY_ALL_CORE
65 y/o male, domiciled at skilled nursing house, non-caregiver, self-reported PPHx of bipolar disorder, no prior admissions, denies hx of SA/SIB, PMHx of HLD, hx of polysubstance use (tobacco, alcohol, crack; reportedly sober x7 months), denies hx of withdrawal seizures/DTs , +legal hx for DWIs, BIB EMS activated by self for SI. Pt endorsed worsening depressive sx in last month after quitting stressful job, and worry of being kicked out of his skilled nursing house d/t lack of participation. He endorsed poor sleep, anhedonia, does not want to get out of bed, low energy, decreased appetite. He also reports passive SI with thoughts of using a knife to his neck or a gun if he had one. UDS negative. Of note, pt with significant family hx of bipolar d/o and endorses episodes of likely hypomania, although difficult to ascertain whether episodes occurred during periods of sobriety. Pt will likely benefit from IPP at this time.     On evaluation, pt presents with some improvement in dysphoric and constricted affect, admits to continued anxious ruminations on events leading to admission and irrational fears resulting in disrupted sleep and appetite (although overall improving). He endorses decrease in passive SI and appears more future-oriented. He is with improving insight, able to verbalize benefits of medications and agreeable to OP f/u, Has been more visible on unit and not a behavioral concern.    #Bipolar depression  -titrate seroquel 100 mg qAM/ 400 mg qhs   -encourage groups/DBT    #AUD in early remission  #Hx cocaine abuse  -CATCH consult    #Agitation  -for agitation not amenable to verbal redirection, may give haldol 5 mg q6h prn and ativan 2 mg q6h prn with escalation to IM if pt is refusing PO and is an acute danger to self or/and others with repeat EKG to ensure QTc <500 ms

## 2024-07-03 PROCEDURE — 99232 SBSQ HOSP IP/OBS MODERATE 35: CPT

## 2024-07-03 RX ADMIN — Medication 2 PUFF(S): at 08:17

## 2024-07-03 RX ADMIN — Medication 2 TABLET(S): at 20:41

## 2024-07-03 RX ADMIN — Medication 100 MILLIGRAM(S): at 08:17

## 2024-07-03 RX ADMIN — Medication 400 MILLIGRAM(S): at 20:41

## 2024-07-03 RX ADMIN — ATORVASTATIN CALCIUM 40 MILLIGRAM(S): 20 TABLET, FILM COATED ORAL at 20:41

## 2024-07-03 NOTE — BH INPATIENT PSYCHIATRY PROGRESS NOTE - NSBHASSESSSUMMFT_PSY_ALL_CORE
65 y/o male, domiciled at senior living house, non-caregiver, self-reported PPHx of bipolar disorder, no prior admissions, denies hx of SA/SIB, PMHx of HLD, hx of polysubstance use (tobacco, alcohol, crack; reportedly sober x7 months), denies hx of withdrawal seizures/DTs , +legal hx for DWIs, BIB EMS activated by self for SI. Pt endorsed worsening depressive sx in last month after quitting stressful job, and worry of being kicked out of his senior living house d/t lack of participation. He endorsed poor sleep, anhedonia, does not want to get out of bed, low energy, decreased appetite. He also reports passive SI with thoughts of using a knife to his neck or a gun if he had one. UDS negative. Of note, pt with significant family hx of bipolar d/o and endorses episodes of likely hypomania, although difficult to ascertain whether episodes occurred during periods of sobriety. Pt will likely benefit from IPP at this time.     On evaluation, pt presents with some improvement in dysphoric and constricted affect, admits to anxious ruminations on ability to function on discharge, no longer perseverates on irrational fears. He endorses overall improving sleep and appetite with decrease in passive SI. He appears more future-oriented, reports desire to find a job and agrees to OP f/u. Has not been a behavioral concern.    #Bipolar depression  -change seroquel 50 mg at 0900 and 1300 / c/w seroquel 400 mg qhs   -encourage groups/DBT    #AUD in early remission  #Hx cocaine abuse  -CATCH consult    #Agitation  -for agitation not amenable to verbal redirection, may give haldol 5 mg q6h prn and ativan 2 mg q6h prn with escalation to IM if pt is refusing PO and is an acute danger to self or/and others with repeat EKG to ensure QTc <500 ms

## 2024-07-03 NOTE — BH INPATIENT PSYCHIATRY PROGRESS NOTE - NSBHCHARTREVIEWVS_PSY_A_CORE FT
Vital Signs Last 24 Hrs  T(C): 36.6 (07-03-24 @ 08:00), Max: 36.6 (07-03-24 @ 08:00)  T(F): 97.9 (07-03-24 @ 08:00), Max: 97.9 (07-03-24 @ 08:00)  HR: 86 (07-03-24 @ 08:00) (74 - 86)  BP: 89/64 (07-03-24 @ 08:00) (89/64 - 125/75)  BP(mean): --  RR: 18 (07-03-24 @ 08:00) (18 - 20)  SpO2: --     Vital Signs Last 24 Hrs  T(C): 37.2 (07-03-24 @ 15:22), Max: 37.2 (07-03-24 @ 15:22)  T(F): 98.9 (07-03-24 @ 15:22), Max: 98.9 (07-03-24 @ 15:22)  HR: 75 (07-03-24 @ 15:22) (75 - 86)  BP: 101/62 (07-03-24 @ 15:22) (89/64 - 101/62)  BP(mean): --  RR: 18 (07-03-24 @ 15:22) (18 - 18)  SpO2: --

## 2024-07-03 NOTE — BH INPATIENT PSYCHIATRY PROGRESS NOTE - NSBHMETABOLIC_PSY_ALL_CORE_FT
BMI: BMI (kg/m2): 27.7 (06-20-24 @ 19:28)  HbA1c: A1C with Estimated Average Glucose Result: 6.3 % (06-22-24 @ 08:25)    Glucose:   BP: 89/64 (07-03-24 @ 08:00) (89/64 - 142/85)Vital Signs Last 24 Hrs  T(C): 36.6 (07-03-24 @ 08:00), Max: 36.6 (07-03-24 @ 08:00)  T(F): 97.9 (07-03-24 @ 08:00), Max: 97.9 (07-03-24 @ 08:00)  HR: 86 (07-03-24 @ 08:00) (74 - 86)  BP: 89/64 (07-03-24 @ 08:00) (89/64 - 125/75)  BP(mean): --  RR: 18 (07-03-24 @ 08:00) (18 - 20)  SpO2: --      Lipid Panel: Date/Time: 06-22-24 @ 08:25  Cholesterol, Serum: 274  LDL Cholesterol Calculated: 213  HDL Cholesterol, Serum: 36  Total Cholesterol/HDL Ration Measurement: --  Triglycerides, Serum: 123   BMI: BMI (kg/m2): 27.7 (06-20-24 @ 19:28)  HbA1c: A1C with Estimated Average Glucose Result: 6.3 % (06-22-24 @ 08:25)    Glucose:   BP: 101/62 (07-03-24 @ 15:22) (89/64 - 142/85)Vital Signs Last 24 Hrs  T(C): 37.2 (07-03-24 @ 15:22), Max: 37.2 (07-03-24 @ 15:22)  T(F): 98.9 (07-03-24 @ 15:22), Max: 98.9 (07-03-24 @ 15:22)  HR: 75 (07-03-24 @ 15:22) (75 - 86)  BP: 101/62 (07-03-24 @ 15:22) (89/64 - 101/62)  BP(mean): --  RR: 18 (07-03-24 @ 15:22) (18 - 18)  SpO2: --      Lipid Panel: Date/Time: 06-22-24 @ 08:25  Cholesterol, Serum: 274  LDL Cholesterol Calculated: 213  HDL Cholesterol, Serum: 36  Total Cholesterol/HDL Ration Measurement: --  Triglycerides, Serum: 123

## 2024-07-03 NOTE — BH INPATIENT PSYCHIATRY PROGRESS NOTE - CURRENT MEDICATION
MEDICATIONS  (STANDING):  atorvastatin 40 milliGRAM(s) Oral at bedtime  budesonide 160 MICROgram(s)/formoterol 4.5 MICROgram(s) Inhaler 2 Puff(s) Inhalation two times a day  nicotine -  14 mG/24Hr(s) Patch 1 Patch Transdermal daily  QUEtiapine 400 milliGRAM(s) Oral at bedtime  senna 2 Tablet(s) Oral at bedtime    MEDICATIONS  (PRN):  acetaminophen     Tablet .. 650 milliGRAM(s) Oral every 6 hours PRN Temp greater or equal to 38C (100.4F), Moderate Pain (4 - 6)  haloperidol     Tablet 5 milliGRAM(s) Oral every 6 hours PRN agitation  hydrOXYzine hydrochloride 50 milliGRAM(s) Oral every 6 hours PRN anxiety/insomnia  LORazepam     Tablet 2 milliGRAM(s) Oral every 6 hours PRN combative bx  nicotine  Polacrilex Gum 2 milliGRAM(s) Oral every 2 hours PRN Smoking Cessation  polyethylene glycol 3350 17 Gram(s) Oral daily PRN Constipation  traZODone 50 milliGRAM(s) Oral at bedtime PRN insomnia

## 2024-07-03 NOTE — BH INPATIENT PSYCHIATRY PROGRESS NOTE - NSBHFUPINTERVALHXFT_PSY_A_CORE
Pt seen and evaluated, chart reviewed. As per nursing report, no acute events overnight, no PRNs. On evaluation, pt presents linear and cooperative, well-groomed. He reports he is feeling better, states he was better able to fall/maintain sleep last night. He admits to anxious ruminations on whether he will continue to "function" on discharge, states he would like to have a job, otherwise no longer perseverates on prior fears of homelessness and legal issues. He reports improving appetite, denies AVH and paranoia. He reports decrease in passive SI, denies intent and plan, able to safety plan. He reports daytime drowsiness with morning dose of seroquel; discussed splitting dose, he verbalizes understanding, denies other negative side effects. He is more visible on unit, encouraged groups/DBT.

## 2024-07-04 RX ADMIN — Medication 400 MILLIGRAM(S): at 20:31

## 2024-07-04 RX ADMIN — ATORVASTATIN CALCIUM 40 MILLIGRAM(S): 20 TABLET, FILM COATED ORAL at 20:31

## 2024-07-04 RX ADMIN — Medication 2 PUFF(S): at 20:31

## 2024-07-04 RX ADMIN — Medication 50 MILLIGRAM(S): at 13:38

## 2024-07-04 RX ADMIN — Medication 2 TABLET(S): at 20:31

## 2024-07-04 RX ADMIN — Medication 2 PUFF(S): at 08:19

## 2024-07-04 RX ADMIN — Medication 50 MILLIGRAM(S): at 08:21

## 2024-07-05 PROCEDURE — 99232 SBSQ HOSP IP/OBS MODERATE 35: CPT

## 2024-07-05 RX ADMIN — Medication 2 PUFF(S): at 08:29

## 2024-07-05 RX ADMIN — POLYETHYLENE GLYCOL 3350 17 GRAM(S): 1 POWDER ORAL at 12:27

## 2024-07-05 RX ADMIN — Medication 50 MILLIGRAM(S): at 14:24

## 2024-07-05 RX ADMIN — ATORVASTATIN CALCIUM 40 MILLIGRAM(S): 20 TABLET, FILM COATED ORAL at 20:46

## 2024-07-05 RX ADMIN — Medication 400 MILLIGRAM(S): at 20:46

## 2024-07-05 RX ADMIN — Medication 2 TABLET(S): at 20:46

## 2024-07-05 RX ADMIN — Medication 50 MILLIGRAM(S): at 08:29

## 2024-07-05 NOTE — BH TREATMENT PLAN - NSTXPLANTHERAPYSESSIONSFT_PSY_ALL_CORE
06-21-24  Type of therapy: Coping skills, Creative arts therapy, Inspiration and motiviation, Leisure development, Self esteem, Social skills training, Stress management, Symptom management  Type of session: Group  Level of patient participation: Engaged, Participates  Duration of participation: 45 minutes  Therapy conducted by: Psych rehab  Therapy Summary: Pt is attending RT sessions , pt engaged in  table top games with peers , pt displayed good focusing skills , pt needed reminders to follow group sessions routine .    06-21-24  Type of therapy: Dialectical behavior therapy, Coping skills  Type of session: Group  Level of patient participation: Engaged, Participates  Duration of participation: 45 minutes  Therapy conducted by: Social work  Therapy Summary: Patient attended the Crisis Skills Group with  and peers. The TIPP Skill was reviewed which identifies ways to help us cope with extreme emotions (by “tipping” our body chemistry). The following skills were reviewed: “Temperature, Intense Exercise, Paced Breathing, and Progressive Muscle Relaxation”.  Patients offered support and feedback while  facilitated the group.    Sancho was an active participant. He appeared open to the therapeutic intervention. He appeared anxious at times, and was open to peer feedback and support. He remained present for the duration of the group.    06-21-24  Type of therapy: Transition planning  Type of session: Group  Level of patient participation: Quiet  Duration of participation: 45 minutes  Therapy conducted by: Social work  Therapy Summary: Patient attended the discharge planning group with  and peers. Patients discussed their plans for discharge including follow up appointments, effective coping skills and social supports. The importance of medication management was discussed.  answered questions and provided support.     Sancho sat quietly during the group. He appeared open to learing about the discharge planning process.    06-21-24  Type of therapy: Self esteem  Type of session: Group  Level of patient participation: Participated with encouragement  Duration of participation: 45 minutes  Therapy conducted by: Social work  Therapy Summary: Patient attended the Social Work group with SW and peers and were asked to identify times where they have shown positive qualities. Patients were encouraged to consider their strengths and how they can be used to help overcome challenges. The goal of this exercise was to increase self-esteem and positive thoughts. SW facilitated the group and patients provided feedback and support.    06-24-24  Type of therapy: Dialectical behavior therapy, Coping skills  Type of session: Group  --  --  Therapy conducted by: Social work  Therapy Summary:  encouraged Sancho to attend the DBT Crisis Skills group today with peers (The pros and cons skill was reviewed). The benefits of attending groups were discussed. Patient declined group attendance. He is encouraged to attend future sessions.    06-26-24  Type of therapy: Dialectical behavior therapy, Coping skills  Type of session: Group  Level of patient participation: Participated with encouragement  Duration of participation: 45 minutes  Therapy conducted by: Social work  Therapy Summary: Patient attended the Crisis Skills Group with  and peers. The DBT Wise Mind ACCEPTS skill was taught (a Distress Tolerance skill that uses distraction to temporarily distance yourself from a distressing situation or emotion). The following skills were reviewed: “activities, contributing, comparisons, emotions, pushing away, thoughts, sensations”. Patients offered support and feedback while  facilitated the group.    Sancho was an active listener. He appeared open to the therapeutic intervention. He remained present and in good behavioral control for the duration of the group.  
  07-01-24  Type of therapy: Dialectical behavior therapy, Coping skills  Type of session: Group  Level of patient participation: Attentive, Participated with encouragement  Duration of participation: 45 minutes  Therapy conducted by: Social work  Therapy Summary: Patient attended the Crisis Skills Group with  and peers. The STOP Skill was reviewed which is a distress tolerance DBT Skill that helps one respond to stressors by staying in control of emotions and not acting solely on impulse. The following steps were reviewed: “Stop, Take a step back, Observe, Proceed effectively”. Patients offered support and feedback while  facilitated the group.    Sancho sat quietly during group. He appeared open to the therapeutic intervention. He was able to identify benefits of using the STOP skill to better respond to stressors. He remained present for the duration of the group.    07-01-24  Type of therapy: Coping skills, Self esteem  Type of session: Group  Level of patient participation: Engaged, Participates  Duration of participation: 60 minutes  Therapy conducted by: Social work  Therapy Summary: Patient attended the Social Work group with SW and peers and were asked to identify times where they have shown positive qualities. Patients were encouraged to consider their strengths and how they can be used to help overcome challenges. The goal of this exercise was to increase self-esteem and positive thoughts. SW facilitated the group and patients provided feedback and support.    Sancho participated in the group activity. He discussed his prior vocational and recreational accomplishments. He provided peer empathy, feedback and support when appropriate.    07-03-24  Type of therapy: Coping skills, Psychoeducation  Type of session: Group  Level of patient participation: Attentive, Engaged, Participates  Duration of participation: 45 minutes  Therapy conducted by: Social work  Therapy Summary: Patient attended the social work group with  and peers. Mental health coping strategies were discussed such as prioritizing self care, reaching out to social supports and staying engaged in activities to distract from stressors. Patients offered support and feedback while  facilitated the discussion.      Sancho participated in the dialogue. He appeared open to reviewing the materials presented. He discussed his desire to use learned coping skills, such as taking walks, for improved mental health. He remained in good behavioral control for the duratiion of the group.    07-03-24  Type of therapy: Coping skills, Inspiration and motiviation, Leisure development, Self esteem, Social skills training, Stress management, Symptom management  Type of session: Group  Level of patient participation: Engaged, Participates, Quiet  Duration of participation: 45 minutes  Therapy conducted by: Psych rehab  Therapy Summary: Pt is attending RT sessions , pt will engage in table top games , pt espically enjoys chess. Pt has been calm and cooperative .  
  06-21-24  Type of therapy: Coping skills, Creative arts therapy, Inspiration and motiviation, Leisure development, Self esteem, Social skills training, Stress management, Symptom management  Type of session: Group  Level of patient participation: Engaged, Participates  Duration of participation: 45 minutes  Therapy conducted by: Psych rehab  Therapy Summary: Pt is attending RT sessions , pt engaged in  table top games with peers , pt displayed good focusing skills , pt needed reminders to follow group sessions routine .

## 2024-07-05 NOTE — BH TREATMENT PLAN - NSTXSUICIDGOAL_PSY_ALL_CORE
Will develop a suicide prevention/safety plan

## 2024-07-05 NOTE — BH INPATIENT PSYCHIATRY DISCHARGE NOTE - NSBHASSESSSUMMFT_PSY_ALL_CORE
65 y/o male, domiciled at care home house, non-caregiver, self-reported PPHx of bipolar disorder, no prior admissions, denies hx of SA/SIB, PMHx of HLD, hx of polysubstance use (tobacco, alcohol, crack; reportedly sober x7 months), denies hx of withdrawal seizures/DTs , +legal hx for DWIs, BIB EMS activated by self for SI. Pt endorsed worsening depressive sx in last month after quitting stressful job, and worry of being kicked out of his care home house d/t lack of participation. He endorsed poor sleep, anhedonia, does not want to get out of bed, low energy, decreased appetite. He also reports passive SI with thoughts of using a knife to his neck or a gun if he had one. Of note, pt with significant family hx of bipolar d/o and endorses episodes of likely hypomania, although difficult to ascertain whether episodes occurred during periods of sobriety. Pt will likely benefit from IPP at this time.     #Bipolar depression  -start seroquel 50 mg qHS, titrate seroquel 100 mg qHS (6/22) **continue to titrate as indicated    #AUD in early remission  #Hx cocaine abuse  -CATCH consult    #Agitation  -for agitation not amenable to verbal redirection, may give haldol 5 mg q6h prn and ativan 2 mg q6h prn with escalation to IM if pt is refusing PO and is an acute danger to self or/and others with repeat EKG to ensure QTc <500 ms 65 y/o male, domiciled at alf house, non-caregiver, self-reported PPHx of bipolar disorder, no prior admissions, denies hx of SA/SIB, PMHx of HLD, hx of polysubstance use (tobacco, alcohol, crack; reportedly sober x7 months), denies hx of withdrawal seizures/DTs , +legal hx for DWIs, BIB EMS activated by self for SI. Pt endorsed worsening depressive sx in last month after quitting stressful job, and worry of being kicked out of his alf house d/t lack of participation. He endorsed poor sleep, anhedonia, does not want to get out of bed, low energy, decreased appetite. He also reports passive SI with thoughts of using a knife to his neck or a gun if he had one. UDS negative. Of note, pt with significant family hx of bipolar d/o and endorses episodes of likely hypomania, although difficult to ascertain whether episodes occurred during periods of sobriety. Pt will likely benefit from IPP at this time.     On evaluation, pt reports he is doing better since admission and is ready for discharge today. He endorses overall improved mood and decrease in anxious ruminations since admission, no longer perseverates on irrational fears. He reports resolution of SI/I/P, is future-oriented and goal-directed with improved insight. Pt reports goals include finding a job, f/u OP and taking his medications. Has not been a behavioral concern. Discharge today.     #Bipolar depression  -c/w seroquel 50 mg at 0900 and 1300 / c/w seroquel 400 mg qhs   -encourage groups/DBT    #AUD in early remission  #Hx cocaine abuse  -CATCH consult    #Agitation  -for agitation not amenable to verbal redirection, may give haldol 5 mg q6h prn and ativan 2 mg q6h prn with escalation to IM if pt is refusing PO and is an acute danger to self or/and others with repeat EKG to ensure QTc

## 2024-07-05 NOTE — BH INPATIENT PSYCHIATRY PROGRESS NOTE - CURRENT MEDICATION
MEDICATIONS  (STANDING):  atorvastatin 40 milliGRAM(s) Oral at bedtime  budesonide 160 MICROgram(s)/formoterol 4.5 MICROgram(s) Inhaler 2 Puff(s) Inhalation two times a day  nicotine -  14 mG/24Hr(s) Patch 1 Patch Transdermal daily  QUEtiapine 400 milliGRAM(s) Oral at bedtime  QUEtiapine 50 milliGRAM(s) Oral <User Schedule>  senna 2 Tablet(s) Oral at bedtime    MEDICATIONS  (PRN):  acetaminophen     Tablet .. 650 milliGRAM(s) Oral every 6 hours PRN Temp greater or equal to 38C (100.4F), Moderate Pain (4 - 6)  haloperidol     Tablet 5 milliGRAM(s) Oral every 6 hours PRN agitation  hydrOXYzine hydrochloride 50 milliGRAM(s) Oral every 6 hours PRN anxiety/insomnia  LORazepam     Tablet 2 milliGRAM(s) Oral every 6 hours PRN combative bx  nicotine  Polacrilex Gum 2 milliGRAM(s) Oral every 2 hours PRN Smoking Cessation  polyethylene glycol 3350 17 Gram(s) Oral daily PRN Constipation  traZODone 50 milliGRAM(s) Oral at bedtime PRN insomnia

## 2024-07-05 NOTE — BH TREATMENT PLAN - NSTXCAREGIVERPARTICIPATE_PSY_P_CORE
Family/Caregiver participated in identification of needs/problems/goals for treatment/Family/Caregiver participated in defining interventions/Family/Caregiver participated in development of after care plan Family/Caregiver participated in identification of needs/problems/goals for treatment/Family/Caregiver participated in development of after care plan

## 2024-07-05 NOTE — BH INPATIENT PSYCHIATRY DISCHARGE NOTE - NSDCHC_MEDRECSTATUS_GEN_ALL_CORE
Form printed and to MD for completion.  Pt seen in clinic earlier today.   Admission Reconciliation is Completed  Discharge Reconciliation is Not Complete Admission Reconciliation is Completed  Discharge Reconciliation is Completed

## 2024-07-05 NOTE — BH TREATMENT PLAN - NSTXDEPRESINTERRN_PSY_ALL_CORE
RN will assess for depressive symptoms  Will discuss medication regimen for depression
RN will assess for depressive symptoms  Will discuss medication regimen for depression
RN to encourage verbalization of feelings.  RN to encourage medication compliance and provide support and education as needed on Dx, coping skills, medication, and safety planning.  RN to encourage daily ADL's  RN to encourage group attendance  RN to assess and intervene for any depressive behaviors

## 2024-07-05 NOTE — BH INPATIENT PSYCHIATRY PROGRESS NOTE - NSBHMETABOLIC_PSY_ALL_CORE_FT
BMI: BMI (kg/m2): 27.7 (06-20-24 @ 19:28)  HbA1c: A1C with Estimated Average Glucose Result: 6.3 % (06-22-24 @ 08:25)    Glucose:   BP: 119/79 (07-05-24 @ 08:31) (89/64 - 125/75)Vital Signs Last 24 Hrs  T(C): 36.4 (07-05-24 @ 08:31), Max: 36.4 (07-05-24 @ 08:31)  T(F): 97.6 (07-05-24 @ 08:31), Max: 97.6 (07-05-24 @ 08:31)  HR: 66 (07-05-24 @ 08:31) (66 - 66)  BP: 119/79 (07-05-24 @ 08:31) (119/79 - 119/79)  BP(mean): --  RR: 18 (07-05-24 @ 08:31) (18 - 18)  SpO2: --      Lipid Panel: Date/Time: 06-22-24 @ 08:25  Cholesterol, Serum: 274  LDL Cholesterol Calculated: 213  HDL Cholesterol, Serum: 36  Total Cholesterol/HDL Ration Measurement: --  Triglycerides, Serum: 123

## 2024-07-05 NOTE — BH INPATIENT PSYCHIATRY DISCHARGE NOTE - NSDCRECOMMEND_PSY_ALL_CORE
Unrestricted diet/activity/Recommended laboratory tests/other investigations following discharge.../Recommended medical follow-up following discharge... Special diet.../Recommended laboratory tests/other investigations following discharge.../Recommended medical follow-up following discharge...

## 2024-07-05 NOTE — BH INPATIENT PSYCHIATRY DISCHARGE NOTE - NSBHMETABOLIC_PSY_ALL_CORE_FT
BMI: BMI (kg/m2): 27.7 (06-20-24 @ 19:28)  HbA1c: A1C with Estimated Average Glucose Result: 6.3 % (06-22-24 @ 08:25)    Glucose:   BP: 119/79 (07-05-24 @ 08:31) (89/64 - 119/79)Vital Signs Last 24 Hrs  T(C): 36.4 (07-05-24 @ 08:31), Max: 36.4 (07-05-24 @ 08:31)  T(F): 97.6 (07-05-24 @ 08:31), Max: 97.6 (07-05-24 @ 08:31)  HR: 66 (07-05-24 @ 08:31) (66 - 66)  BP: 119/79 (07-05-24 @ 08:31) (119/79 - 119/79)  BP(mean): --  RR: 18 (07-05-24 @ 08:31) (18 - 18)  SpO2: --      Lipid Panel: Date/Time: 06-22-24 @ 08:25  Cholesterol, Serum: 274  LDL Cholesterol Calculated: 213  HDL Cholesterol, Serum: 36  Total Cholesterol/HDL Ration Measurement: --  Triglycerides, Serum: 123

## 2024-07-05 NOTE — BH INPATIENT PSYCHIATRY DISCHARGE NOTE - NSBHPSYCHMEDS_PSY_A_CORE
How Severe Is It?: mild Is This A New Presentation, Or A Follow-Up?: Follow Up Nail Dystrophy Additional History: Patient says overall she think her nail has improved. Patient completed terbinafine for one month. no

## 2024-07-05 NOTE — BH INPATIENT PSYCHIATRY DISCHARGE NOTE - NSDCCPCAREPLAN_GEN_ALL_CORE_FT
PRINCIPAL DISCHARGE DIAGNOSIS  Diagnosis: Bipolar depression  Assessment and Plan of Treatment: Continue current medication regimen and follow up with aftercare appointments      SECONDARY DISCHARGE DIAGNOSES  Diagnosis: Alcohol use disorder, severe, in early remission  Assessment and Plan of Treatment: Continue current medication regimen and follow up with aftercare appointments    Diagnosis: History of cocaine abuse  Assessment and Plan of Treatment: Continue current medication regimen and follow up with aftercare appointments

## 2024-07-05 NOTE — BH TREATMENT PLAN - NSCMSPTSTRENGTHS_PSY_ALL_CORE
Compliance to treatment/Expressive of emotions/Future/goal oriented/Highly motivated for treatment/Motivated/Resourceful/Supportive family
Compliance to treatment/Expressive of emotions/Flexibility/Highly motivated for treatment/Motivated/Sense of Humor
Compliance to treatment/Expressive of emotions/Flexibility/Highly motivated for treatment/Motivated/Sense of Humor

## 2024-07-05 NOTE — BH INPATIENT PSYCHIATRY DISCHARGE NOTE - HPI (INCLUDE ILLNESS QUALITY, SEVERITY, DURATION, TIMING, CONTEXT, MODIFYING FACTORS, ASSOCIATED SIGNS AND SYMPTOMS)
65 y/o male, domiciled at [facility], non-caregiver, self-reported PPHx of bipolar disorder, no prior admissions, denies hx of SA/SIB, PMHx of HLD, hx of polysubstance use (tobacco, alcohol, crack; reportedly sober x7 months), denies hx of withdrawal seizures/DTs , +legal hx for DWIs, BIB EMS activated by self for SI to [facility], and admitted to [facility] IPP.    Pt reported he has been shaking a lot, feels dehydrated, states he is hardly urinating, and hasn't been eating resulting in >10 lb weight loss in last month.  Pt reports depressed mood on and off his whole life, however significantly worsened in last month after quitting his job 1.5 months ago. He reports his job as a  was too stressful. He reports in the last month, he has been with poor sleep, anhedonia, does not want to get out of bed, low energy, decreased appetite. He also reports passive SI with thoughts of using a knife to his neck or a gun if he had one.  Pt unable to identify what stops him from acting on these thoughts. He admits to fear that he will be kicked out of the [facility] house though denies relapsing or doing anything specific to warrant that. 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 He reports h/o episodes lasting 2-3 days of decreased sleep, elevated energy and increased goal-directed activities; of note, pt also reports longest period of sobriety lasting 1 year since age 7. He denies AH or VH. He denies his life is in danger or others are trying to harm/follow him. No overt delusions elicited.    SAFE Act completed:   Submitted On: 6/19/2024 6:15:49 PM    Reference Number: [id number]

## 2024-07-05 NOTE — BH INPATIENT PSYCHIATRY PROGRESS NOTE - NSBHFUPINTERVALHXFT_PSY_A_CORE
Pt seen and evaluated, chart reviewed. As per nursing report, no acute events overnight, no PRNs. On evaluation, pt presents linear and cooperative, fair ADLs. He reports he is feeling better and inquiries when he can be discharged. He admits to anxious ruminations on whether he will be able to continue to "function" on discharge, states he would like to have a job, otherwise no longer perseverates on prior fears of homelessness and legal issues. He denies SI/HI, intent and plan. He presents more future-oriented, reports he is more hopeful that he will be able to maintain wellness and agrees to OP f/u. He endorses improving appetite, denies AVH and paranoia. He is more visible on unit and in groups/DBT. Has not been a behavioral concern. Anticipated discharge for Monday.

## 2024-07-05 NOTE — BH INPATIENT PSYCHIATRY DISCHARGE NOTE - NSDCMRMEDTOKEN_GEN_ALL_CORE_FT
simvastatin 80 mg oral tablet: 1 tab(s) orally once a day  Symbicort 160 mcg-4.5 mcg/inh inhalation aerosol: 2 puff(s) inhaled 2 times a day  Zoloft 25 mg oral tablet: 1 tab(s) orally once a day   nicotine 14 mg/24 hr transdermal film, extended release: 1 patch transdermal once a day Continue to take as prescribed until told otherwise by your provider  polyethylene glycol 3350 oral powder for reconstitution: 17 gram(s) orally once a day x 14 days as needed for Constipation Continue to take as prescribed until told otherwise by your provider  QUEtiapine 400 mg oral tablet: 1 tab(s) orally once a day (at bedtime) x 30 days Continue to take as prescribed until told otherwise by your provider  QUEtiapine 50 mg oral tablet: 1 tab(s) orally 2 times a day Take at 8 AM and 1 PM, Continue to take as prescribed until told otherwise by your provider  senna leaf extract oral tablet: 2 tab(s) orally once a day (at bedtime) x 14 days Continue to take as prescribed until told otherwise by your provider  simvastatin 80 mg oral tablet: 1 tab(s) orally once a day Continue to take as prescribed until told otherwise by your provider  Symbicort 160 mcg-4.5 mcg/inh inhalation aerosol: 2 puff(s) inhaled 2 times a day  traZODone 50 mg oral tablet: 1 tab(s) orally once a day (at bedtime) x 14 days as needed for insomnia Continue to take as prescribed until told otherwise by your provider   nicotine 14 mg/24 hr transdermal film, extended release: 1 patch transdermal once a day Continue to take as prescribed until told otherwise by your provider  polyethylene glycol 3350 oral powder for reconstitution: 17 gram(s) orally once a day x 14 days as needed for Constipation Continue to take as prescribed until told otherwise by your provider  QUEtiapine 400 mg oral tablet: 1 tab(s) orally once a day (at bedtime) x 30 days Continue to take as prescribed until told otherwise by your provider  QUEtiapine 50 mg oral tablet: 1 tab(s) orally 2 times a day Take at 8 AM and 1 PM, Continue to take as prescribed until told otherwise by your provider  senna leaf extract oral tablet: 2 tab(s) orally once a day (at bedtime) x 14 days Continue to take as prescribed until told otherwise by your provider  simvastatin 80 mg oral tablet: 1 tab(s) orally once a day x 30 days Continue to take as prescribed until told otherwise by your provider  Symbicort 160 mcg-4.5 mcg/inh inhalation aerosol: 2 puff(s) inhaled 2 times a day x 30 days  traZODone 50 mg oral tablet: 1 tab(s) orally once a day (at bedtime) x 14 days as needed for insomnia Continue to take as prescribed until told otherwise by your provider

## 2024-07-05 NOTE — BH INPATIENT PSYCHIATRY DISCHARGE NOTE - HOSPITAL COURSE
Pt has made good progress over the course of hospitalization. With continuous psychotherapy from the treatment team and the medications, he reports feeling better with improved sleep and appetite. Medications adjusted as follows- started on seroquel to target mood, titrated seroquel 50 mg at 0900 and 1300, and seroquel 400 mg qhs. Pt tolerated medications well, denied negative side effects. Thought process and insight improved. He endorsed overall improved mood and decrease in anxious ruminations since admission, no longer perseverates on irrational fears (eg legal issues with car in news, being evicted from retirement house). He reports resolution of SI/I/P, is future-oriented and goal-directed with improved insight. Pt reports goals include finding a job, f/u OP and taking his medications. Pt has been in good behavioral control. Denied any suicidal or homicidal ideations. Denied any auditory or visual hallucinations. Pt was evaluated by treatment team, pt is stable for discharge and shows no imminent danger to self, others or property at this time. He understands and agrees with treatment plan and following up with outpatient. Psychoeducation provided regarding diagnosis, medications, treatment and follow up. Risks, benefits, alternatives discussed, all questions and concerns addressed and answered. Reviewed crisis intervention with verbalized understanding.

## 2024-07-05 NOTE — BH INPATIENT PSYCHIATRY PROGRESS NOTE - NSBHCHARTREVIEWVS_PSY_A_CORE FT
Vital Signs Last 24 Hrs  T(C): 36.4 (07-05-24 @ 08:31), Max: 36.4 (07-05-24 @ 08:31)  T(F): 97.6 (07-05-24 @ 08:31), Max: 97.6 (07-05-24 @ 08:31)  HR: 66 (07-05-24 @ 08:31) (66 - 66)  BP: 119/79 (07-05-24 @ 08:31) (119/79 - 119/79)  BP(mean): --  RR: 18 (07-05-24 @ 08:31) (18 - 18)  SpO2: --

## 2024-07-05 NOTE — BH INPATIENT PSYCHIATRY DISCHARGE NOTE - NSBHFUPINTERVALHXFT_PSY_A_CORE
Pt seen and evaluated, chart reviewed. As per nursing report, pt c/o constipation, otherwise no other acute events overnight. On evaluation, pt presents linear and cooperative, talking on phone, fair ADLs. He agrees to interview privately in his room. He reports he is "ok" and is ready for discharge today. He admits to anxious ruminations on what he has to do on discharge, including f/u with his OP providers, staying on his medications and finding a job. He endorses overall improved mood and resolution of SI/I/P. He denies AVH, paranoia. He no longer mentions prior irrational fears. He reports improved sleep and appetite. He reports resolution of constipation. He denies SI/HI, intent and plan. He is future-oriented and goal-directed. Has not been a behavioral concern. He denies negative side effects of medications. Discharge today.

## 2024-07-05 NOTE — BH INPATIENT PSYCHIATRY PROGRESS NOTE - NSBHASSESSSUMMFT_PSY_ALL_CORE
65 y/o male, domiciled at long term house, non-caregiver, self-reported PPHx of bipolar disorder, no prior admissions, denies hx of SA/SIB, PMHx of HLD, hx of polysubstance use (tobacco, alcohol, crack; reportedly sober x7 months), denies hx of withdrawal seizures/DTs , +legal hx for DWIs, BIB EMS activated by self for SI. Pt endorsed worsening depressive sx in last month after quitting stressful job, and worry of being kicked out of his long term house d/t lack of participation. He endorsed poor sleep, anhedonia, does not want to get out of bed, low energy, decreased appetite. He also reports passive SI with thoughts of using a knife to his neck or a gun if he had one. UDS negative. Of note, pt with significant family hx of bipolar d/o and endorses episodes of likely hypomania, although difficult to ascertain whether episodes occurred during periods of sobriety. Pt will likely benefit from IPP at this time.     On evaluation, pt reports improving anxious ruminations and no longer perseverates on irrational fears. He reports feeling more hopeful on ability to function on discharge and is able to endorse effective coping skills. He endorses overall improving sleep and appetite with resolution of SI. He appears more future-oriented, reports desire to find a job and agrees to OP f/u. Has not been a behavioral concern. Anticipated discharge for Monday.     #Bipolar depression  -c/w seroquel 50 mg at 0900 and 1300 / c/w seroquel 400 mg qhs   -encourage groups/DBT    #AUD in early remission  #Hx cocaine abuse  -CATCH consult    #Agitation  -for agitation not amenable to verbal redirection, may give haldol 5 mg q6h prn and ativan 2 mg q6h prn with escalation to IM if pt is refusing PO and is an acute danger to self or/and others with repeat EKG to ensure QTc <500 ms

## 2024-07-05 NOTE — BH TREATMENT PLAN - NSTXDCOPLKINTERSW_PSY_ALL_CORE
When the patient is scheduled for discharge the patient will be referred to the appropriate level of care for OPD services.

## 2024-07-06 RX ADMIN — Medication 50 MILLIGRAM(S): at 08:26

## 2024-07-06 RX ADMIN — Medication 2 TABLET(S): at 20:08

## 2024-07-06 RX ADMIN — POLYETHYLENE GLYCOL 3350 17 GRAM(S): 1 POWDER ORAL at 08:27

## 2024-07-06 RX ADMIN — Medication 400 MILLIGRAM(S): at 20:08

## 2024-07-06 RX ADMIN — Medication 50 MILLIGRAM(S): at 14:12

## 2024-07-06 RX ADMIN — ATORVASTATIN CALCIUM 40 MILLIGRAM(S): 20 TABLET, FILM COATED ORAL at 20:08

## 2024-07-07 RX ADMIN — Medication 50 MILLIGRAM(S): at 14:32

## 2024-07-07 RX ADMIN — Medication 2 TABLET(S): at 20:29

## 2024-07-07 RX ADMIN — ATORVASTATIN CALCIUM 40 MILLIGRAM(S): 20 TABLET, FILM COATED ORAL at 20:29

## 2024-07-07 RX ADMIN — POLYETHYLENE GLYCOL 3350 17 GRAM(S): 1 POWDER ORAL at 17:53

## 2024-07-07 RX ADMIN — Medication 400 MILLIGRAM(S): at 20:29

## 2024-07-07 RX ADMIN — Medication 50 MILLIGRAM(S): at 08:14

## 2024-07-08 VITALS
DIASTOLIC BLOOD PRESSURE: 79 MMHG | TEMPERATURE: 98 F | RESPIRATION RATE: 16 BRPM | HEART RATE: 69 BPM | SYSTOLIC BLOOD PRESSURE: 105 MMHG

## 2024-07-08 PROCEDURE — 99238 HOSP IP/OBS DSCHRG MGMT 30/<: CPT

## 2024-07-08 RX ORDER — NICOTINE POLACRILEX 2 MG/1
1 LOZENGE ORAL
Qty: 0 | Refills: 0 | DISCHARGE
Start: 2024-07-08

## 2024-07-08 RX ORDER — SENNOSIDES 8.6 MG
2 TABLET ORAL
Qty: 28 | Refills: 0
Start: 2024-07-08 | End: 2024-07-21

## 2024-07-08 RX ORDER — SIMVASTATIN 40 MG
1 TABLET ORAL
Qty: 30 | Refills: 0
Start: 2024-07-08 | End: 2024-08-06

## 2024-07-08 RX ORDER — SIMVASTATIN 40 MG
1 TABLET ORAL
Qty: 0 | Refills: 0 | DISCHARGE

## 2024-07-08 RX ORDER — BUDESONIDE/FORMOTEROL FUMARATE 160-4.5MCG
2 HFA AEROSOL WITH ADAPTER (GRAM) INHALATION
Qty: 1 | Refills: 0
Start: 2024-07-08 | End: 2024-08-06

## 2024-07-08 RX ORDER — TRAZODONE HYDROCHLORIDE 50 MG/1
1 TABLET, FILM COATED ORAL
Qty: 14 | Refills: 0
Start: 2024-07-08 | End: 2024-07-21

## 2024-07-08 RX ORDER — POLYETHYLENE GLYCOL 3350 1 G/G
17 POWDER ORAL
Qty: 14 | Refills: 0
Start: 2024-07-08 | End: 2024-07-21

## 2024-07-08 RX ORDER — BUDESONIDE/FORMOTEROL FUMARATE 160-4.5MCG
2 HFA AEROSOL WITH ADAPTER (GRAM) INHALATION
Refills: 0 | DISCHARGE

## 2024-07-08 RX ORDER — SERTRALINE HYDROCHLORIDE 100 MG/1
1 TABLET, FILM COATED ORAL
Refills: 0 | DISCHARGE

## 2024-07-08 RX ADMIN — NICOTINE POLACRILEX 1 PATCH: 2 LOZENGE ORAL at 08:02

## 2024-07-08 RX ADMIN — Medication 50 MILLIGRAM(S): at 08:02

## 2024-07-08 RX ADMIN — POLYETHYLENE GLYCOL 3350 17 GRAM(S): 1 POWDER ORAL at 08:07

## 2024-07-08 NOTE — BH INPATIENT PSYCHIATRY PROGRESS NOTE - NSBHMETABOLIC_PSY_ALL_CORE_FT
BMI: BMI (kg/m2): 27.7 (06-20-24 @ 19:28)  HbA1c: A1C with Estimated Average Glucose Result: 6.3 % (06-22-24 @ 08:25)    Glucose:   BP: 127/78 (07-07-24 @ 15:30) (115/72 - 127/78)Vital Signs Last 24 Hrs  T(C): 36.8 (07-07-24 @ 15:30), Max: 36.8 (07-07-24 @ 15:30)  T(F): 98.2 (07-07-24 @ 15:30), Max: 98.2 (07-07-24 @ 15:30)  HR: 77 (07-07-24 @ 15:30) (77 - 77)  BP: 127/78 (07-07-24 @ 15:30) (127/78 - 127/78)  BP(mean): --  RR: 18 (07-07-24 @ 15:30) (18 - 18)  SpO2: --      Lipid Panel: Date/Time: 06-22-24 @ 08:25  Cholesterol, Serum: 274  LDL Cholesterol Calculated: 213  HDL Cholesterol, Serum: 36  Total Cholesterol/HDL Ration Measurement: --  Triglycerides, Serum: 123   BMI: BMI (kg/m2): 27.7 (06-20-24 @ 19:28)  HbA1c: A1C with Estimated Average Glucose Result: 6.3 % (06-22-24 @ 08:25)    Glucose:   BP: 105/79 (07-08-24 @ 08:00) (105/79 - 127/78)Vital Signs Last 24 Hrs  T(C): 36.5 (07-08-24 @ 08:00), Max: 36.8 (07-07-24 @ 15:30)  T(F): 97.7 (07-08-24 @ 08:00), Max: 98.2 (07-07-24 @ 15:30)  HR: 69 (07-08-24 @ 08:00) (69 - 77)  BP: 105/79 (07-08-24 @ 08:00) (105/79 - 127/78)  BP(mean): --  RR: 16 (07-08-24 @ 08:00) (16 - 18)  SpO2: --      Lipid Panel: Date/Time: 06-22-24 @ 08:25  Cholesterol, Serum: 274  LDL Cholesterol Calculated: 213  HDL Cholesterol, Serum: 36  Total Cholesterol/HDL Ration Measurement: --  Triglycerides, Serum: 123

## 2024-07-08 NOTE — BH INPATIENT PSYCHIATRY PROGRESS NOTE - NSBHMSERECMEM_PSY_A_CORE
Unable to assess
Unable to assess
Impaired
Unable to assess
Impaired
Unable to assess
Unable to assess
Impaired

## 2024-07-08 NOTE — BH INPATIENT PSYCHIATRY PROGRESS NOTE - NSBHMSEMOVE_PSY_A_CORE
No abnormal movements
Other
Other
No abnormal movements
Other
No abnormal movements

## 2024-07-08 NOTE — BH INPATIENT PSYCHIATRY PROGRESS NOTE - NSDCCRITERIA_PSY_ALL_CORE
When pt is no longer an acute or imminent risk of harm to self or/and others, and is able to care for self safely, pt may then be discharged

## 2024-07-08 NOTE — BH INPATIENT PSYCHIATRY PROGRESS NOTE - NSBHMSETHTPROC_PSY_A_CORE
Impaired reasoning
Impaired reasoning
Linear/Impaired reasoning
Impaired reasoning
Impaired reasoning
Linear/Impaired reasoning
Linear/Impaired reasoning
Impaired reasoning
Linear/Impaired reasoning
Linear/Impaired reasoning
Linear
Linear

## 2024-07-08 NOTE — BH INPATIENT PSYCHIATRY PROGRESS NOTE - NSTXSUICIDDATETRGT_PSY_ALL_CORE
05-Jul-2024
28-Jun-2024
05-Jul-2024
28-Jun-2024
05-Jul-2024
28-Jun-2024
28-Jun-2024

## 2024-07-08 NOTE — BH INPATIENT PSYCHIATRY PROGRESS NOTE - NSTXANXGOAL_PSY_ALL_CORE
Be able to participate in activities despite lingering anxiety/panic

## 2024-07-08 NOTE — BH INPATIENT PSYCHIATRY PROGRESS NOTE - NSTXDCOPLKDATETRGT_PSY_ALL_CORE
(W19.XXXA) Fall, initial encounter  (R07.81) Rib pain on right side  Alert, pleasant 69-year-old female presents amatory from home in no acute distress for evaluation of right-sided rib pain.  Last night she had purely mechanical fall and she landed hitting the right side of her ribs onto the edge of the couch.  She denies hitting her head.  She denies any new/worsening neck pain.  She denies any other injuries.  She has some tenderness to her right anterior lower ribs without gross deformity.  There is no ecchymosis or erythema.  X-rays negative for any fractures.  Plan for discharge home and symptomatic treatments.  Encouraged coughing and deep breathing.  Recommend:  - Manage pain with acetaminophen (Tylenol) 1,000 mg every 8 hours and ibuprofen (Advil) 800 mg with food every 8 hours. *You can alternate these every 4 hours. For example: 8 am ibuprofen, 12 pm acetaminophen, 4 pm ibuprofen, 8 pm acetaminophen, etc.*  - Ice and/or heat  - Topical anesthetic such as lidocaine patch, biofreeze  - Ensure you are taking 10 slow deep breaths, hold for 5 seconds and let out.       Mary Aponte CNP      Results for orders placed or performed during the hospital encounter of 10/21/21   Ribs XR, unilat 3 views + PA chest, right     Status: None    Narrative    PROCEDURE: XR RIBS & CHEST RT 3VW 10/21/2021 1:45 PM    HISTORY: fall, pain    COMPARISONS: 2019    TECHNIQUE: Chest one view, right RIBS 2 views    FINDINGS: Chest: There is a calcified granuloma at the left lung base.  The lungs are clear of active pulmonary infiltrates. There is no  pneumothorax or pleural effusion. The heart is normal in size.    Right RIBS 2 views: There is no right rib fracture or destructive  lesion noted.         Impression    IMPRESSION: No rib fractures.    No active pulmonary infiltrates.    MUMTAZ QUEVEDO MD         SYSTEM ID:  [id number]       
28-Jun-2024
05-Jul-2024
28-Jun-2024
05-Jul-2024
28-Jun-2024
05-Jul-2024

## 2024-07-08 NOTE — BH INPATIENT PSYCHIATRY PROGRESS NOTE - NSBHMSEAFFQUAL_PSY_A_CORE
Depressed/Irritable/Anxious
Depressed/Irritable/Anxious
Anxious/Other
Anxious
Anxious
Anxious/Other
Anxious
Depressed/Anxious
Depressed/Irritable/Anxious
Anxious/Other

## 2024-07-08 NOTE — BH INPATIENT PSYCHIATRY PROGRESS NOTE - NSICDXBHTERTIARYDX_PSY_ALL_CORE
R/O MDD (major depressive disorder)   F32.9  

## 2024-07-08 NOTE — BH CHART NOTE - RISK ASSESSMENT
Suicide and risk assessment performed prior to discharge. The patient with low acute risk and elevated chronic risk. Protective factors include help-seeking bx, denying SI/HI, future orientation, goal-directed, no hx/recent SIB/SA, able to verbalize reasons for living, improving insight, sobriety, able to safety plan. Risk factors include presenting illness and h/o polysubstance use. Immediate risk was minimized by inpatient admission to a safe environment with appropriate supervision and limited access to lethal means. Future risk was minimized before discharge by treatment of acute episode, maximizing outpatient support, providing relevant patient education, discussing emergency procedures, and ensuring close follow-up. The patient remains at a low risk of self-harm, and such risk cannot be further ameliorated by continued inpatient treatment and the patient is therefore appropriate for discharge.

## 2024-07-08 NOTE — BH INPATIENT PSYCHIATRY PROGRESS NOTE - ATTENDING COMMENTS
I have reviewed case with PNP, made any necessary revisions and concur with findings and plans. 

## 2024-07-08 NOTE — BH INPATIENT PSYCHIATRY PROGRESS NOTE - NSBHATTESTBILLING_PSY_A_CORE
48403-Naowojqzlv OBS or IP - moderate complexity OR 35-49 mins
60749-Apcdvomatl OBS or IP - moderate complexity OR 35-49 mins
42878-Ledfqhifrh OBS or IP - moderate complexity OR 35-49 mins
97929-Dusrrfedvb OBS or IP - moderate complexity OR 35-49 mins
56452-Vgzwodkxrv OBS or IP - moderate complexity OR 35-49 mins
71982-Gememjlbnx OBS or IP - moderate complexity OR 35-49 mins
34248-Jjzxdtlzxi OBS or IP - moderate complexity OR 35-49 mins
97064-Tqhgrxbsee OBS or IP - moderate complexity OR 35-49 mins
45881-Borxssnezj OBS or IP - moderate complexity OR 35-49 mins
46358-Bykxzwjcsh OBS or IP - moderate complexity OR 35-49 mins
01511-Jmubnptyaq OBS or IP - low complexity OR 25-34 mins
57968-Xohdkvzcij OBS or IP - moderate complexity OR 35-49 mins

## 2024-07-08 NOTE — BH INPATIENT PSYCHIATRY PROGRESS NOTE - NSBHMETABOLICLABS_PSY_ALL_CORE
Labs within last 12 months
All labs not within last 12 months, ordered
Labs within last 12 months
Labs within last 12 months
All labs not within last 12 months, ordered
Labs within last 12 months
Labs within last 12 months

## 2024-07-08 NOTE — BH INPATIENT PSYCHIATRY PROGRESS NOTE - NSBHROSSYSTEMS_PSY_ALL_CORE
Psychiatric
Gastrointestinal.../Psychiatric
Psychiatric
Gastrointestinal.../Psychiatric
Psychiatric
Gastrointestinal.../Psychiatric
Psychiatric
Gastrointestinal.../Psychiatric
Psychiatric
Psychiatric

## 2024-07-08 NOTE — BH INPATIENT PSYCHIATRY PROGRESS NOTE - NSTXDEPRESDATETRGT_PSY_ALL_CORE
05-Jul-2024
28-Jun-2024
05-Jul-2024
28-Jun-2024
28-Jun-2024
12-Jul-2024
09-Jul-2024
28-Jun-2024

## 2024-07-08 NOTE — BH INPATIENT PSYCHIATRY PROGRESS NOTE - CURRENT MEDICATION
MEDICATIONS  (STANDING):  atorvastatin 40 milliGRAM(s) Oral at bedtime  budesonide 160 MICROgram(s)/formoterol 4.5 MICROgram(s) Inhaler 2 Puff(s) Inhalation two times a day  nicotine -  14 mG/24Hr(s) Patch 1 Patch Transdermal daily  QUEtiapine 50 milliGRAM(s) Oral <User Schedule>  QUEtiapine 400 milliGRAM(s) Oral at bedtime  senna 2 Tablet(s) Oral at bedtime    MEDICATIONS  (PRN):  acetaminophen     Tablet .. 650 milliGRAM(s) Oral every 6 hours PRN Temp greater or equal to 38C (100.4F), Moderate Pain (4 - 6)  haloperidol     Tablet 5 milliGRAM(s) Oral every 6 hours PRN agitation  hydrOXYzine hydrochloride 50 milliGRAM(s) Oral every 6 hours PRN anxiety/insomnia  nicotine  Polacrilex Gum 2 milliGRAM(s) Oral every 2 hours PRN Smoking Cessation  polyethylene glycol 3350 17 Gram(s) Oral daily PRN Constipation  traZODone 50 milliGRAM(s) Oral at bedtime PRN insomnia   MEDICATIONS  (STANDING):  atorvastatin 40 milliGRAM(s) Oral at bedtime  budesonide 160 MICROgram(s)/formoterol 4.5 MICROgram(s) Inhaler 2 Puff(s) Inhalation two times a day  nicotine -  14 mG/24Hr(s) Patch 1 Patch Transdermal daily  QUEtiapine 400 milliGRAM(s) Oral at bedtime  QUEtiapine 50 milliGRAM(s) Oral <User Schedule>  senna 2 Tablet(s) Oral at bedtime    MEDICATIONS  (PRN):  acetaminophen     Tablet .. 650 milliGRAM(s) Oral every 6 hours PRN Temp greater or equal to 38C (100.4F), Moderate Pain (4 - 6)  haloperidol     Tablet 5 milliGRAM(s) Oral every 6 hours PRN agitation  hydrOXYzine hydrochloride 50 milliGRAM(s) Oral every 6 hours PRN anxiety/insomnia  nicotine  Polacrilex Gum 2 milliGRAM(s) Oral every 2 hours PRN Smoking Cessation  polyethylene glycol 3350 17 Gram(s) Oral daily PRN Constipation  traZODone 50 milliGRAM(s) Oral at bedtime PRN insomnia

## 2024-07-08 NOTE — BH INPATIENT PSYCHIATRY PROGRESS NOTE - NSBHMSEMOOD_PSY_A_CORE
Anxious/Other
Depressed/Anxious/Irritable
Depressed/Anxious
Depressed/Anxious/Irritable
Normal
Depressed/Anxious
Other
Anxious
Depressed/Anxious
Other
Anxious
Anxious/Other

## 2024-07-08 NOTE — BH INPATIENT PSYCHIATRY PROGRESS NOTE - NSTXSUICIDGOAL_PSY_ALL_CORE
Will develop a suicide prevention/safety plan

## 2024-07-08 NOTE — BH INPATIENT PSYCHIATRY PROGRESS NOTE - NSBHMSEMUSCLE_PSY_A_CORE
Unable to assess
Normal muscle tone/strength
Unable to assess

## 2024-07-08 NOTE — BH INPATIENT PSYCHIATRY PROGRESS NOTE - NSTXDCOPLKINTERMD_PSY_ALL_CORE
SW coordination

## 2024-07-08 NOTE — BH INPATIENT PSYCHIATRY PROGRESS NOTE - NSTXDCOPLKGOAL_PSY_ALL_CORE
Will agree to consider an appropriate level of outpatient care
WDL
Will agree to consider an appropriate level of outpatient care

## 2024-07-08 NOTE — BH INPATIENT PSYCHIATRY PROGRESS NOTE - NSBHATTESTBILLONSITE_PSY_A_CORE
HORACIO to bill

## 2024-07-08 NOTE — BH INPATIENT PSYCHIATRY PROGRESS NOTE - NSICDXBHSECONDARYDX_PSY_ALL_CORE
Alcohol use disorder, severe, in early remission   F10.21  History of cocaine abuse   F14.11  

## 2024-07-08 NOTE — BH INPATIENT PSYCHIATRY PROGRESS NOTE - PRN MEDS
MEDICATIONS  (PRN):  acetaminophen     Tablet .. 650 milliGRAM(s) Oral every 6 hours PRN Temp greater or equal to 38C (100.4F), Moderate Pain (4 - 6)  haloperidol     Tablet 5 milliGRAM(s) Oral every 6 hours PRN agitation  hydrOXYzine hydrochloride 50 milliGRAM(s) Oral every 6 hours PRN anxiety/insomnia  LORazepam     Tablet 2 milliGRAM(s) Oral every 6 hours PRN combative bx  nicotine  Polacrilex Gum 2 milliGRAM(s) Oral every 2 hours PRN Smoking Cessation  traZODone 50 milliGRAM(s) Oral at bedtime PRN insomnia  
MEDICATIONS  (PRN):  acetaminophen     Tablet .. 650 milliGRAM(s) Oral every 6 hours PRN Temp greater or equal to 38C (100.4F), Moderate Pain (4 - 6)  haloperidol     Tablet 5 milliGRAM(s) Oral every 6 hours PRN agitation  hydrOXYzine hydrochloride 50 milliGRAM(s) Oral every 6 hours PRN anxiety/insomnia  LORazepam     Tablet 2 milliGRAM(s) Oral every 6 hours PRN combative bx  nicotine  Polacrilex Gum 2 milliGRAM(s) Oral every 2 hours PRN Smoking Cessation  polyethylene glycol 3350 17 Gram(s) Oral daily PRN Constipation  traZODone 50 milliGRAM(s) Oral at bedtime PRN insomnia  
MEDICATIONS  (PRN):  acetaminophen     Tablet .. 650 milliGRAM(s) Oral every 6 hours PRN Temp greater or equal to 38C (100.4F), Moderate Pain (4 - 6)  haloperidol     Tablet 5 milliGRAM(s) Oral every 6 hours PRN agitation  hydrOXYzine hydrochloride 50 milliGRAM(s) Oral every 6 hours PRN anxiety/insomnia  LORazepam     Tablet 2 milliGRAM(s) Oral every 6 hours PRN combative bx  nicotine  Polacrilex Gum 2 milliGRAM(s) Oral every 2 hours PRN Smoking Cessation  traZODone 50 milliGRAM(s) Oral at bedtime PRN insomnia  
MEDICATIONS  (PRN):  acetaminophen     Tablet .. 650 milliGRAM(s) Oral every 6 hours PRN Temp greater or equal to 38C (100.4F), Moderate Pain (4 - 6)  haloperidol     Tablet 5 milliGRAM(s) Oral every 6 hours PRN agitation  hydrOXYzine hydrochloride 50 milliGRAM(s) Oral every 6 hours PRN anxiety/insomnia  LORazepam     Tablet 2 milliGRAM(s) Oral every 6 hours PRN combative bx  nicotine  Polacrilex Gum 2 milliGRAM(s) Oral every 2 hours PRN Smoking Cessation  polyethylene glycol 3350 17 Gram(s) Oral daily PRN Constipation  traZODone 50 milliGRAM(s) Oral at bedtime PRN insomnia  
MEDICATIONS  (PRN):  acetaminophen     Tablet .. 650 milliGRAM(s) Oral every 6 hours PRN Temp greater or equal to 38C (100.4F), Moderate Pain (4 - 6)  haloperidol     Tablet 5 milliGRAM(s) Oral every 6 hours PRN agitation  hydrOXYzine hydrochloride 50 milliGRAM(s) Oral every 6 hours PRN anxiety/insomnia  LORazepam     Tablet 2 milliGRAM(s) Oral every 6 hours PRN combative bx  nicotine  Polacrilex Gum 2 milliGRAM(s) Oral every 2 hours PRN Smoking Cessation  traZODone 50 milliGRAM(s) Oral at bedtime PRN insomnia  
MEDICATIONS  (PRN):  acetaminophen     Tablet .. 650 milliGRAM(s) Oral every 6 hours PRN Temp greater or equal to 38C (100.4F), Moderate Pain (4 - 6)  haloperidol     Tablet 5 milliGRAM(s) Oral every 6 hours PRN agitation  hydrOXYzine hydrochloride 50 milliGRAM(s) Oral every 6 hours PRN anxiety/insomnia  LORazepam     Tablet 2 milliGRAM(s) Oral every 6 hours PRN combative bx  nicotine  Polacrilex Gum 2 milliGRAM(s) Oral every 2 hours PRN Smoking Cessation  polyethylene glycol 3350 17 Gram(s) Oral daily PRN Constipation  traZODone 50 milliGRAM(s) Oral at bedtime PRN insomnia  
MEDICATIONS  (PRN):  acetaminophen     Tablet .. 650 milliGRAM(s) Oral every 6 hours PRN Temp greater or equal to 38C (100.4F), Moderate Pain (4 - 6)  haloperidol     Tablet 5 milliGRAM(s) Oral every 6 hours PRN agitation  hydrOXYzine hydrochloride 50 milliGRAM(s) Oral every 6 hours PRN anxiety/insomnia  LORazepam     Tablet 2 milliGRAM(s) Oral every 6 hours PRN combative bx  nicotine  Polacrilex Gum 2 milliGRAM(s) Oral every 2 hours PRN Smoking Cessation  polyethylene glycol 3350 17 Gram(s) Oral daily PRN Constipation  traZODone 50 milliGRAM(s) Oral at bedtime PRN insomnia  
MEDICATIONS  (PRN):  acetaminophen     Tablet .. 650 milliGRAM(s) Oral every 6 hours PRN Temp greater or equal to 38C (100.4F), Moderate Pain (4 - 6)  haloperidol     Tablet 5 milliGRAM(s) Oral every 6 hours PRN agitation  hydrOXYzine hydrochloride 50 milliGRAM(s) Oral every 6 hours PRN anxiety/insomnia  LORazepam     Tablet 2 milliGRAM(s) Oral every 6 hours PRN combative bx  nicotine  Polacrilex Gum 2 milliGRAM(s) Oral every 2 hours PRN Smoking Cessation  traZODone 50 milliGRAM(s) Oral at bedtime PRN insomnia  
MEDICATIONS  (PRN):  acetaminophen     Tablet .. 650 milliGRAM(s) Oral every 6 hours PRN Temp greater or equal to 38C (100.4F), Moderate Pain (4 - 6)  haloperidol     Tablet 5 milliGRAM(s) Oral every 6 hours PRN agitation  hydrOXYzine hydrochloride 50 milliGRAM(s) Oral every 6 hours PRN anxiety/insomnia  nicotine  Polacrilex Gum 2 milliGRAM(s) Oral every 2 hours PRN Smoking Cessation  polyethylene glycol 3350 17 Gram(s) Oral daily PRN Constipation  traZODone 50 milliGRAM(s) Oral at bedtime PRN insomnia  
MEDICATIONS  (PRN):  acetaminophen     Tablet .. 650 milliGRAM(s) Oral every 6 hours PRN Temp greater or equal to 38C (100.4F), Moderate Pain (4 - 6)  haloperidol     Tablet 5 milliGRAM(s) Oral every 6 hours PRN agitation  hydrOXYzine hydrochloride 50 milliGRAM(s) Oral every 6 hours PRN anxiety/insomnia  LORazepam     Tablet 2 milliGRAM(s) Oral every 6 hours PRN combative bx  nicotine  Polacrilex Gum 2 milliGRAM(s) Oral every 2 hours PRN Smoking Cessation  polyethylene glycol 3350 17 Gram(s) Oral daily PRN Constipation  traZODone 50 milliGRAM(s) Oral at bedtime PRN insomnia  
MEDICATIONS  (PRN):  acetaminophen     Tablet .. 650 milliGRAM(s) Oral every 6 hours PRN Temp greater or equal to 38C (100.4F), Moderate Pain (4 - 6)  haloperidol     Tablet 5 milliGRAM(s) Oral every 6 hours PRN agitation  hydrOXYzine hydrochloride 50 milliGRAM(s) Oral every 6 hours PRN anxiety/insomnia  LORazepam     Tablet 2 milliGRAM(s) Oral every 6 hours PRN combative bx  nicotine  Polacrilex Gum 2 milliGRAM(s) Oral every 2 hours PRN Smoking Cessation  traZODone 50 milliGRAM(s) Oral at bedtime PRN insomnia  
MEDICATIONS  (PRN):  acetaminophen     Tablet .. 650 milliGRAM(s) Oral every 6 hours PRN Temp greater or equal to 38C (100.4F), Moderate Pain (4 - 6)  haloperidol     Tablet 5 milliGRAM(s) Oral every 6 hours PRN agitation  hydrOXYzine hydrochloride 50 milliGRAM(s) Oral every 6 hours PRN anxiety/insomnia  LORazepam     Tablet 2 milliGRAM(s) Oral every 6 hours PRN combative bx  nicotine  Polacrilex Gum 2 milliGRAM(s) Oral every 2 hours PRN Smoking Cessation  polyethylene glycol 3350 17 Gram(s) Oral daily PRN Constipation  traZODone 50 milliGRAM(s) Oral at bedtime PRN insomnia

## 2024-07-08 NOTE — BH INPATIENT PSYCHIATRY PROGRESS NOTE - NSBHATTESTTYPEVISIT_PSY_A_CORE
On-site Attending supervising HORACIO (99XXX codes)
Attending with Resident/Fellow/Student
Attending with Resident/Fellow/Student
On-site Attending supervising HORACIO (99XXX codes)

## 2024-07-08 NOTE — BH INPATIENT PSYCHIATRY PROGRESS NOTE - NSBHFUPINTERVALCCFT_PSY_A_CORE
"I want to be good"
"Terrible"
"None"
"I'm going to get kicked out"
"I'm feeling a bit better" "more positive"
"Same"
"None"
"More positive I guess, I don't know"
"horrible" (when asked how he is feeling"
"I'm not supposed to be here, I'm supposed to be with the police"
"I'm ready"
"Ok"

## 2024-07-08 NOTE — BH INPATIENT PSYCHIATRY PROGRESS NOTE - NSTXANXDATEEST_PSY_ALL_CORE
21-Jun-2024

## 2024-07-08 NOTE — BH INPATIENT PSYCHIATRY PROGRESS NOTE - NSTXDCOPLKDATEEST_PSY_ALL_CORE
20-Jun-2024

## 2024-07-08 NOTE — BH INPATIENT PSYCHIATRY PROGRESS NOTE - NSICDXBHPRIMARYDX_PSY_ALL_CORE
Bipolar depression   F31.9  

## 2024-07-08 NOTE — BH INPATIENT PSYCHIATRY PROGRESS NOTE - NSBHCHARTREVIEWVS_PSY_A_CORE FT
Vital Signs Last 24 Hrs  T(C): 36.8 (07-07-24 @ 15:30), Max: 36.8 (07-07-24 @ 15:30)  T(F): 98.2 (07-07-24 @ 15:30), Max: 98.2 (07-07-24 @ 15:30)  HR: 77 (07-07-24 @ 15:30) (77 - 77)  BP: 127/78 (07-07-24 @ 15:30) (127/78 - 127/78)  BP(mean): --  RR: 18 (07-07-24 @ 15:30) (18 - 18)  SpO2: --     Vital Signs Last 24 Hrs  T(C): 36.5 (07-08-24 @ 08:00), Max: 36.8 (07-07-24 @ 15:30)  T(F): 97.7 (07-08-24 @ 08:00), Max: 98.2 (07-07-24 @ 15:30)  HR: 69 (07-08-24 @ 08:00) (69 - 77)  BP: 105/79 (07-08-24 @ 08:00) (105/79 - 127/78)  BP(mean): --  RR: 16 (07-08-24 @ 08:00) (16 - 18)  SpO2: --

## 2024-07-08 NOTE — BH INPATIENT PSYCHIATRY PROGRESS NOTE - NSTXDCOPLKPROGRES_PSY_ALL_CORE
Improving
No Change
Improving
No Change
No Change
Met - goal discontinued
No Change
Improving
Met - goal discontinued
No Change

## 2024-07-08 NOTE — BH INPATIENT PSYCHIATRY PROGRESS NOTE - NSTXSUICIDPROGRES_PSY_ALL_CORE
Improving
Met - goal discontinued
Improving
Met - goal discontinued
Improving

## 2024-07-08 NOTE — BH INPATIENT PSYCHIATRY PROGRESS NOTE - NSTXANXDATETRGT_PSY_ALL_CORE
05-Jul-2024
05-Jul-2024
28-Jun-2024
05-Jul-2024
28-Jun-2024
05-Jul-2024
28-Jun-2024
05-Jul-2024
05-Jul-2024

## 2024-07-08 NOTE — BH INPATIENT PSYCHIATRY PROGRESS NOTE - NSBHASSESSSUMMFT_PSY_ALL_CORE
63 y/o male, domiciled at [facility] house, non-caregiver, self-reported PPHx of bipolar disorder, no prior admissions, denies hx of SA/SIB, PMHx of HLD, hx of polysubstance use (tobacco, alcohol, crack; reportedly sober x7 months), denies hx of withdrawal seizures/DTs , +legal hx for DWIs, BIB EMS activated by self for SI. Pt endorsed worsening depressive sx in last month after quitting stressful job, and worry of being kicked out of his [facility] house d/t lack of participation. He endorsed poor sleep, anhedonia, does not want to get out of bed, low energy, decreased appetite. He also reports passive SI with thoughts of using a knife to his neck or a gun if he had one. UDS negative. Of note, pt with significant family hx of bipolar d/o and endorses episodes of likely hypomania, although difficult to ascertain whether episodes occurred during periods of sobriety. Pt will likely benefit from IPP at this time.     On evaluation, pt reports he is doing better since admission and is ready for discharge today. He endorses overall improved mood and decrease in anxious ruminations since admission, no longer perseverates on irrational fears. He reports resolution of SI/I/P, is future-oriented and goal-directed with improved insight. Pt reports goals include finding a job, f/u OP and taking his medications. Has not been a behavioral concern. Discharge today.     #Bipolar depression  -c/w seroquel 50 mg at 0900 and 1300 / c/w seroquel 400 mg qhs   -encourage groups/DBT    #AUD in early remission  #Hx cocaine abuse  -CATCH consult    #Agitation  -for agitation not amenable to verbal redirection, may give haldol 5 mg q6h prn and ativan 2 mg q6h prn with escalation to IM if pt is refusing PO and is an acute danger to self or/and others with repeat EKG to ensure QTc <500 ms

## 2024-07-08 NOTE — BH CHART NOTE - NSSUICPROTFACT_PSY_ALL_CORE
Identifies reasons for living/Supportive social network of family or friends/Fear of death or the actual act of killing self/Frustration tolerance

## 2024-07-08 NOTE — BH INPATIENT PSYCHIATRY PROGRESS NOTE - NSBHCONSDANGERSELF_PSY_A_CORE
suicidal ideation with plan and means

## 2024-07-08 NOTE — BH INPATIENT PSYCHIATRY PROGRESS NOTE - NSBHMSETHTCONTENT_PSY_A_CORE
Ruminations
Preoccupations/Ruminations/Guilt
Ruminations
Ruminations
Preoccupations/Ruminations/Guilt
Ruminations
Preoccupations/Ruminations/Guilt

## 2024-07-08 NOTE — BH INPATIENT PSYCHIATRY PROGRESS NOTE - NSTXANXINTERMD_PSY_ALL_CORE
Medications management, encourage groups/DBT

## 2024-07-10 NOTE — BH SOCIAL WORK CONFIRMATION FOLLOW UP NOTE - NSLINKEDTOLOC_PSY_ALL_CORE
Sancho attended his outpatient mental health appointment at [facility] as scheduled, [phone].

## 2024-07-11 DIAGNOSIS — F31.9 BIPOLAR DISORDER, UNSPECIFIED: ICD-10-CM

## 2024-07-11 DIAGNOSIS — F10.21 ALCOHOL DEPENDENCE, IN REMISSION: ICD-10-CM

## 2024-07-11 DIAGNOSIS — E66.3 OVERWEIGHT: ICD-10-CM

## 2024-07-11 DIAGNOSIS — E78.5 HYPERLIPIDEMIA, UNSPECIFIED: ICD-10-CM

## 2024-07-11 DIAGNOSIS — F14.11 COCAINE ABUSE, IN REMISSION: ICD-10-CM

## 2024-10-01 ENCOUNTER — NON-APPOINTMENT (OUTPATIENT)
Age: 64
End: 2024-10-01

## 2025-03-07 ENCOUNTER — NON-APPOINTMENT (OUTPATIENT)
Age: 65
End: 2025-03-07
